# Patient Record
Sex: FEMALE | Race: WHITE | NOT HISPANIC OR LATINO | Employment: OTHER | URBAN - METROPOLITAN AREA
[De-identification: names, ages, dates, MRNs, and addresses within clinical notes are randomized per-mention and may not be internally consistent; named-entity substitution may affect disease eponyms.]

---

## 2017-11-27 ENCOUNTER — HOSPITAL ENCOUNTER (EMERGENCY)
Facility: HOSPITAL | Age: 62
Discharge: HOME | End: 2017-11-27
Attending: EMERGENCY MEDICINE
Payer: MEDICARE

## 2017-11-27 VITALS
HEIGHT: 65 IN | BODY MASS INDEX: 23.66 KG/M2 | HEART RATE: 61 BPM | WEIGHT: 142 LBS | RESPIRATION RATE: 16 BRPM | SYSTOLIC BLOOD PRESSURE: 117 MMHG | OXYGEN SATURATION: 96 % | TEMPERATURE: 98 F | DIASTOLIC BLOOD PRESSURE: 71 MMHG

## 2017-11-27 DIAGNOSIS — G89.4 CHRONIC PAIN SYNDROME: ICD-10-CM

## 2017-11-27 DIAGNOSIS — S39.012A STRAIN OF LUMBAR REGION, INITIAL ENCOUNTER: ICD-10-CM

## 2017-11-27 DIAGNOSIS — S29.019A THORACIC MYOFASCIAL STRAIN, INITIAL ENCOUNTER: Primary | ICD-10-CM

## 2017-11-27 DIAGNOSIS — M25.552 LEFT HIP PAIN: ICD-10-CM
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
## 2017-11-27 DIAGNOSIS — I10 ESSENTIAL HYPERTENSION: ICD-10-CM

## 2017-11-27 DIAGNOSIS — M54.2 NECK PAIN: ICD-10-CM

## 2017-11-27 PROCEDURE — 99284 EMERGENCY DEPT VISIT MOD MDM: CPT

## 2017-11-27 PROCEDURE — 25000003 PHARM REV CODE 250: Performed by: EMERGENCY MEDICINE

## 2017-11-27 RX ORDER — CYCLOBENZAPRINE HCL 10 MG
10 TABLET ORAL 3 TIMES DAILY PRN
Qty: 30 TABLET | Refills: 0 | Status: ON HOLD | OUTPATIENT
Start: 2017-11-27 | End: 2020-02-09 | Stop reason: SDUPTHER

## 2017-11-27 RX ORDER — CYCLOBENZAPRINE HCL 10 MG
10 TABLET ORAL
Status: COMPLETED | OUTPATIENT
Start: 2017-11-27 | End: 2017-11-27

## 2017-11-27 RX ORDER — MELOXICAM 7.5 MG/1
7.5 TABLET ORAL DAILY PRN
Qty: 30 TABLET | Refills: 0 | Status: SHIPPED | OUTPATIENT
Start: 2017-11-27 | End: 2017-12-27

## 2017-11-27 RX ADMIN — CYCLOBENZAPRINE HYDROCHLORIDE 10 MG: 10 TABLET, FILM COATED ORAL at 02:11
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
## 2018-01-22 ENCOUNTER — HOSPITAL ENCOUNTER (OUTPATIENT)
Dept: RADIOLOGY | Facility: HOSPITAL | Age: 63
Discharge: HOME | End: 2018-01-22
Attending: FAMILY MEDICINE
Payer: MEDICARE

## 2018-01-22 VITALS — BODY MASS INDEX: 23.66 KG/M2 | WEIGHT: 142 LBS | HEIGHT: 65 IN

## 2018-01-22 DIAGNOSIS — I10 HTN (HYPERTENSION): ICD-10-CM

## 2018-01-22 DIAGNOSIS — Z12.39 SCREENING BREAST EXAMINATION: Primary | ICD-10-CM

## 2018-01-22 DIAGNOSIS — N18.2 CHRONIC KIDNEY DISEASE, STAGE II (MILD): ICD-10-CM

## 2018-01-22 DIAGNOSIS — Z12.39 SCREENING BREAST EXAMINATION: ICD-10-CM

## 2018-01-22 DIAGNOSIS — N18.2 CHRONIC KIDNEY DISEASE, STAGE II (MILD): Primary | ICD-10-CM

## 2018-01-22 PROCEDURE — 76770 US EXAM ABDO BACK WALL COMP: CPT | Mod: 26,,, | Performed by: RADIOLOGY

## 2018-01-22 PROCEDURE — 76770 US EXAM ABDO BACK WALL COMP: CPT | Mod: TC,PO

## 2018-01-22 PROCEDURE — 77067 SCR MAMMO BI INCL CAD: CPT | Mod: TC,PO

## 2018-01-22 PROCEDURE — 77063 BREAST TOMOSYNTHESIS BI: CPT | Mod: 26,,, | Performed by: RADIOLOGY

## 2018-01-22 PROCEDURE — 77067 SCR MAMMO BI INCL CAD: CPT | Mod: 26,,, | Performed by: RADIOLOGY

## 2018-01-29 ENCOUNTER — TELEPHONE (OUTPATIENT)
Dept: RADIOLOGY | Facility: HOSPITAL | Age: 63
End: 2018-01-29

## 2018-02-05 ENCOUNTER — HOSPITAL ENCOUNTER (OUTPATIENT)
Dept: RADIOLOGY | Facility: HOSPITAL | Age: 63
Discharge: HOME | End: 2018-02-05
Attending: FAMILY MEDICINE
Payer: MEDICARE

## 2018-02-05 DIAGNOSIS — R92.8 ABNORMAL MAMMOGRAM: ICD-10-CM

## 2018-02-05 PROCEDURE — 77065 DX MAMMO INCL CAD UNI: CPT | Mod: 26,,, | Performed by: RADIOLOGY

## 2018-02-05 PROCEDURE — 77061 BREAST TOMOSYNTHESIS UNI: CPT | Mod: TC,PO

## 2018-02-05 PROCEDURE — 76642 ULTRASOUND BREAST LIMITED: CPT | Mod: TC,PO,RT

## 2018-02-05 PROCEDURE — 76642 ULTRASOUND BREAST LIMITED: CPT | Mod: 26,RT,, | Performed by: RADIOLOGY

## 2018-02-05 PROCEDURE — 77061 BREAST TOMOSYNTHESIS UNI: CPT | Mod: 26,,, | Performed by: RADIOLOGY

## 2018-02-05 PROCEDURE — 77065 DX MAMMO INCL CAD UNI: CPT | Mod: TC,PO

## 2018-09-14 ENCOUNTER — HOSPITAL ENCOUNTER (EMERGENCY)
Facility: HOSPITAL | Age: 63
Discharge: ANOTHER HOSPITAL | End: 2018-09-14
Attending: EMERGENCY MEDICINE
Payer: COMMERCIAL

## 2018-09-14 VITALS
WEIGHT: 140 LBS | RESPIRATION RATE: 20 BRPM | HEART RATE: 60 BPM | SYSTOLIC BLOOD PRESSURE: 149 MMHG | TEMPERATURE: 100 F | BODY MASS INDEX: 23.3 KG/M2 | OXYGEN SATURATION: 93 % | DIASTOLIC BLOOD PRESSURE: 71 MMHG

## 2018-09-14 DIAGNOSIS — R06.02 SOB (SHORTNESS OF BREATH): ICD-10-CM

## 2018-09-14 DIAGNOSIS — I50.9 CONGESTIVE HEART FAILURE, UNSPECIFIED HF CHRONICITY, UNSPECIFIED HEART FAILURE TYPE: Primary | ICD-10-CM

## 2018-09-14 LAB
ALBUMIN SERPL BCP-MCNC: 3.5 G/DL
ALLENS TEST: ABNORMAL
ALP SERPL-CCNC: 84 U/L
ALT SERPL W/O P-5'-P-CCNC: 41 U/L
ANION GAP SERPL CALC-SCNC: 10 MMOL/L
AST SERPL-CCNC: 36 U/L
BASOPHILS # BLD AUTO: 0.11 K/UL
BASOPHILS NFR BLD: 0.9 %
BILIRUB SERPL-MCNC: 0.4 MG/DL
BNP SERPL-MCNC: 1828 PG/ML
BUN SERPL-MCNC: 42 MG/DL
CALCIUM SERPL-MCNC: 9.5 MG/DL
CHLORIDE SERPL-SCNC: 106 MMOL/L
CO2 SERPL-SCNC: 26 MMOL/L
CREAT SERPL-MCNC: 2 MG/DL
DELSYS: ABNORMAL
DIFFERENTIAL METHOD: ABNORMAL
EOSINOPHIL # BLD AUTO: 0.1 K/UL
EOSINOPHIL NFR BLD: 0.4 %
ERYTHROCYTE [DISTWIDTH] IN BLOOD BY AUTOMATED COUNT: 13.3 %
EST. GFR  (AFRICAN AMERICAN): 29.6 ML/MIN/1.73 M^2
EST. GFR  (NON AFRICAN AMERICAN): 25.6 ML/MIN/1.73 M^2
FLOW: 4
GLUCOSE SERPL-MCNC: 165 MG/DL
HCO3 UR-SCNC: 28 MMOL/L (ref 24–28)
HCT VFR BLD AUTO: 36.1 %
HGB BLD-MCNC: 11.1 G/DL
LYMPHOCYTES # BLD AUTO: 1.1 K/UL
LYMPHOCYTES NFR BLD: 9.1 %
MCH RBC QN AUTO: 28.8 PG
MCHC RBC AUTO-ENTMCNC: 30.7 G/DL
MCV RBC AUTO: 94 FL
MODE: ABNORMAL
MONOCYTES # BLD AUTO: 0.6 K/UL
MONOCYTES NFR BLD: 5.2 %
NEUTROPHILS # BLD AUTO: 10.4 K/UL
NEUTROPHILS NFR BLD: 84.2 %
PCO2 BLDA: 50.3 MMHG (ref 35–45)
PH SMN: 7.35 [PH] (ref 7.35–7.45)
PLATELET # BLD AUTO: 183 K/UL
PMV BLD AUTO: 11.7 FL
PO2 BLDA: 42 MMHG (ref 80–100)
POC BE: 2 MMOL/L
POC SATURATED O2: 75 % (ref 95–100)
POTASSIUM SERPL-SCNC: 4.5 MMOL/L
PROT SERPL-MCNC: 7.5 G/DL
RBC # BLD AUTO: 3.86 M/UL
SAMPLE: ABNORMAL
SITE: ABNORMAL
SODIUM SERPL-SCNC: 142 MMOL/L
TROPONIN I SERPL DL<=0.01 NG/ML-MCNC: 0.01 NG/ML
WBC # BLD AUTO: 12.35 K/UL

## 2018-09-14 PROCEDURE — 25000003 PHARM REV CODE 250: Performed by: EMERGENCY MEDICINE

## 2018-09-14 PROCEDURE — 36600 WITHDRAWAL OF ARTERIAL BLOOD: CPT

## 2018-09-14 PROCEDURE — 25000242 PHARM REV CODE 250 ALT 637 W/ HCPCS: Performed by: EMERGENCY MEDICINE

## 2018-09-14 PROCEDURE — 85025 COMPLETE CBC W/AUTO DIFF WBC: CPT

## 2018-09-14 PROCEDURE — 84484 ASSAY OF TROPONIN QUANT: CPT

## 2018-09-14 PROCEDURE — 27000221 HC OXYGEN, UP TO 24 HOURS

## 2018-09-14 PROCEDURE — 27100171 HC OXYGEN HIGH FLOW UP TO 24 HOURS

## 2018-09-14 PROCEDURE — 63600175 PHARM REV CODE 636 W HCPCS: Performed by: EMERGENCY MEDICINE

## 2018-09-14 PROCEDURE — 82803 BLOOD GASES ANY COMBINATION: CPT

## 2018-09-14 PROCEDURE — 94640 AIRWAY INHALATION TREATMENT: CPT

## 2018-09-14 PROCEDURE — 96374 THER/PROPH/DIAG INJ IV PUSH: CPT

## 2018-09-14 PROCEDURE — 96375 TX/PRO/DX INJ NEW DRUG ADDON: CPT

## 2018-09-14 PROCEDURE — 93010 ELECTROCARDIOGRAM REPORT: CPT | Mod: ,,, | Performed by: INTERNAL MEDICINE

## 2018-09-14 PROCEDURE — 99900035 HC TECH TIME PER 15 MIN (STAT)

## 2018-09-14 PROCEDURE — 83880 ASSAY OF NATRIURETIC PEPTIDE: CPT

## 2018-09-14 PROCEDURE — 80053 COMPREHEN METABOLIC PANEL: CPT

## 2018-09-14 PROCEDURE — 27000190 HC CPAP FULL FACE MASK W/VALVE

## 2018-09-14 PROCEDURE — 99291 CRITICAL CARE FIRST HOUR: CPT | Mod: 25

## 2018-09-14 RX ORDER — HYDRALAZINE HYDROCHLORIDE 50 MG/1
50 TABLET, FILM COATED ORAL 3 TIMES DAILY
Status: ON HOLD | COMMUNITY
Start: 2018-08-14 | End: 2020-02-09

## 2018-09-14 RX ORDER — CLONIDINE HYDROCHLORIDE 0.1 MG/1
0.1 TABLET ORAL ONCE
Status: ON HOLD | COMMUNITY
End: 2020-02-09 | Stop reason: HOSPADM

## 2018-09-14 RX ORDER — ASPIRIN 81 MG/1
81 TABLET ORAL DAILY
Status: ON HOLD | COMMUNITY
End: 2020-02-09 | Stop reason: HOSPADM

## 2018-09-14 RX ORDER — LOSARTAN POTASSIUM 100 MG/1
100 TABLET ORAL DAILY
Status: ON HOLD | COMMUNITY
End: 2020-02-09 | Stop reason: HOSPADM

## 2018-09-14 RX ORDER — FUROSEMIDE 10 MG/ML
40 INJECTION INTRAMUSCULAR; INTRAVENOUS
Status: COMPLETED | OUTPATIENT
Start: 2018-09-14 | End: 2018-09-14

## 2018-09-14 RX ORDER — METHYLPREDNISOLONE SOD SUCC 125 MG
125 VIAL (EA) INJECTION
Status: COMPLETED | OUTPATIENT
Start: 2018-09-14 | End: 2018-09-14

## 2018-09-14 RX ORDER — ONDANSETRON 2 MG/ML
4 INJECTION INTRAMUSCULAR; INTRAVENOUS
Status: COMPLETED | OUTPATIENT
Start: 2018-09-14 | End: 2018-09-14

## 2018-09-14 RX ORDER — METOPROLOL SUCCINATE 100 MG/1
100 TABLET, EXTENDED RELEASE ORAL DAILY
Status: ON HOLD | COMMUNITY
End: 2020-02-09 | Stop reason: HOSPADM

## 2018-09-14 RX ORDER — IPRATROPIUM BROMIDE AND ALBUTEROL SULFATE 2.5; .5 MG/3ML; MG/3ML
3 SOLUTION RESPIRATORY (INHALATION)
Status: COMPLETED | OUTPATIENT
Start: 2018-09-14 | End: 2018-09-14

## 2018-09-14 RX ORDER — GABAPENTIN 300 MG/1
300 CAPSULE ORAL 3 TIMES DAILY
Status: ON HOLD | COMMUNITY
End: 2020-02-09 | Stop reason: HOSPADM

## 2018-09-14 RX ADMIN — METHYLPREDNISOLONE SODIUM SUCCINATE 125 MG: 125 INJECTION, POWDER, FOR SOLUTION INTRAMUSCULAR; INTRAVENOUS at 07:09

## 2018-09-14 RX ADMIN — IPRATROPIUM BROMIDE AND ALBUTEROL SULFATE 3 ML: .5; 3 SOLUTION RESPIRATORY (INHALATION) at 07:09

## 2018-09-14 RX ADMIN — ONDANSETRON 4 MG: 2 INJECTION INTRAMUSCULAR; INTRAVENOUS at 08:09

## 2018-09-14 RX ADMIN — NITROGLYCERIN 1 INCH: 20 OINTMENT TOPICAL at 08:09

## 2018-09-14 RX ADMIN — FUROSEMIDE 40 MG: 10 INJECTION, SOLUTION INTRAMUSCULAR; INTRAVENOUS at 08:09

## 2018-09-14 NOTE — ED NOTES
"Pt c/o SOB. Reports onset of symptoms last night. Symptoms worsen with  exertion. Previous treatments include neb treatment at home. Hx of COPD and abdominal aneurysm, but daughter also reports that she had carotid surgery last month . Pt denies CP. Also report hx of BP in R arm being greater than BP in L arm.     Level of Consciousness: Patient is awake, alert, oriented to person, place, time, and situation, but having some hallucinations.    Appearance: Pt sitting up in stretcher,moderate distress at this time. Appears ill.    Skin: Skin is warm, dry, and intact. Skin turgor is normal/elastic. Mucous membranes moist. Skin color is normal for ethnicity. No skin breakdown noted.  Musculoskeletal: Full active ROM. No deformities noted. Generalized fatigue. Gait unsteady, pt weak and unable to stand on her own.   Respiratory: Respirations labored,tachypneic. Breath sounds diminished with expiratory wheezes throughout.   Cardiac: Regular rate and rhythm with ST depression. +trace edema to RLL. Radial and pedal pulses present and normal. Capillary refill is within normal limits. Denies chest pain.    GI: Abdomen soft, non-tender to all quadrants with palpation. Bowel sounds present and active in all quads. Abdomen symmetric with no distention noted. Denies any V/D. +nausea  Neurological: Symmetrical expressions noted to face.  No obvious neurological deficits noted.   Psychosocial: Speech spontaneous, mumbled. Family at bedside. Pt is appears anxious with some visual hallucinations.  Pt asked daughter, "Who put a snake in my pants."     Pt informed of plan of care, verbalizes understanding, and denies any other questions, complaints, or concerns at this time. Bed in locked in lowest position, siderails up x2, call light within reach. Pt placed on cardiac monitor, blood pressure cuff and continuous pulse ox. Will continue to monitor.  "

## 2018-09-14 NOTE — ED NOTES
Ivana from [facility] connected with JU[facility], Spoke with Stella, house supervisor. Pt accepted by Dr. Davis Nicole.

## 2018-09-14 NOTE — PROGRESS NOTES
Patient came into ER very short of breath , after 3 duo nebs patient work of breathing has improved . ABG done and shown to

## 2018-09-14 NOTE — ED PROVIDER NOTES
Encounter Date: 9/14/2018       History     Chief Complaint   Patient presents with    Shortness of Breath     onset last night. hx of COPD. O2: 67% in triage     Patient is a 63-year-old female with a past medical history as described below including COPD and recent carotid thromboendarterectomy in the past month by Dr. Whitney at [facility], who presents today with complaints of shortness of breath with confusion and hallucinations.  Patient's oxygen saturation was 67% in triage.  Hypoxia was confirmed in the ED exam room with good waveform.  Patient has been taking her albuterol at home without relief.  Patient is not on any home oxygen.  Patient has no known history of CHF.  No pedal edema.  Denies chest pain, cough, fever/chills, headache, head trauma, nausea/vomiting, diaphoresis, new leg pain/swelling.  Family bedside does state that patient had a prolonged hospital stay after her recent carotid surgery, and states that 1 of the reasons was hypoxia.  Patient is on daily aspirin and Plavix          Review of patient's allergies indicates:  No Known Allergies  Past Medical History:   Diagnosis Date    Cervical cancer     Chronic kidney disease (CKD), stage II (mild)     COPD (chronic obstructive pulmonary disease)     GERD (gastroesophageal reflux disease)     Hypertension     Liver disease     Lumbar herniated disc     Renal disorder     Thyroid disease      Past Surgical History:   Procedure Laterality Date    CAROTID ENDARTERECTOMY      CHOLECYSTECTOMY      HYSTERECTOMY      PARATHYROIDECTOMY      TONSILLECTOMY       Family History   Problem Relation Age of Onset    COPD Neg Hx      Social History     Tobacco Use    Smoking status: Current Every Day Smoker     Packs/day: 1.00     Years: 32.00     Pack years: 32.00     Types: Cigarettes    Smokeless tobacco: Never Used   Substance Use Topics    Alcohol use: No    Drug use: No     Review of Systems   Constitutional: Negative for  chills, diaphoresis and fever.   HENT: Negative for congestion, rhinorrhea and sore throat.    Eyes: Negative for pain, redness and visual disturbance.   Respiratory: Positive for shortness of breath. Negative for cough.    Cardiovascular: Negative for chest pain, palpitations and leg swelling.   Gastrointestinal: Negative for abdominal distention, abdominal pain, blood in stool, constipation, diarrhea, nausea and vomiting.   Genitourinary: Negative for dysuria and hematuria.   Musculoskeletal: Negative for arthralgias and joint swelling.   Skin: Negative for rash and wound.   Neurological: Negative for seizures, syncope and headaches.   Psychiatric/Behavioral: Positive for confusion and hallucinations.   All other systems reviewed and are negative.      Physical Exam     Initial Vitals [09/14/18 0736]   BP Pulse Resp Temp SpO2   119/62 71 (!) 28 99.4 °F (37.4 °C) (!) 67 %      MAP       --         Vitals:    09/14/18 0755 09/14/18 0758 09/14/18 0813 09/14/18 0901   BP:    (!) 164/72   BP Location:    Right arm   Patient Position:    Sitting   Pulse: 66 67  61   Resp: (!) 24 (!) 24  19   Temp:       TempSrc:       SpO2: (!) 91% (!) 91% (!) 89% 97%   Weight:       O2 97%  after being placed on BiPAP    Physical Exam    Nursing note and vitals reviewed.  Constitutional: She appears well-developed and well-nourished. No distress.   HENT:   Head: Normocephalic and atraumatic.   Mouth/Throat: Oropharynx is clear and moist.   Eyes: Conjunctivae and EOM are normal. Pupils are equal, round, and reactive to light.   Neck: Neck supple. No tracheal deviation present.   Cardiovascular: Normal rate, regular rhythm, normal heart sounds and intact distal pulses.   Pulmonary/Chest: No respiratory distress. She has wheezes (bilateral expiratory wheezes).   Bilateral B lines on bedside ultrasound   Abdominal: Soft. She exhibits no distension. There is no tenderness. There is no rebound and no guarding.   Musculoskeletal: Normal range  of motion. She exhibits no edema or tenderness.   Neurological: She is alert and oriented to person, place, and time.   No focal deficits; awake and alert to person, time, and place, however patient is making some odd comments that suggest hallucinations; GCS 13 ([id number])   Skin: Skin is warm. No rash noted. No erythema.   Psychiatric: She has a normal mood and affect. Her behavior is normal.         ED Course   Critical Care  Date/Time: 9/14/2018 8:53 AM  Performed by: Giuseppe Beasley MD  Authorized by: Giuseppe Beasley MD   Direct patient critical care time: 15 minutes  Additional history critical care time: 10 minutes  Ordering / reviewing critical care time: 10 minutes  Documentation critical care time: 5 minutes  Consulting other physicians critical care time: 5 minutes  Total critical care time (exclusive of procedural time) : 45 minutes  Critical care time was exclusive of separately billable procedures and treating other patients and teaching time.  Critical care was necessary to treat or prevent imminent or life-threatening deterioration of the following conditions: cardiac failure, respiratory failure and CNS failure or compromise.  Critical care was time spent personally by me on the following activities: blood draw for specimens, discussions with consultants, evaluation of patient's response to treatment, obtaining history from patient or surrogate, ordering and review of laboratory studies, pulse oximetry, review of old charts, development of treatment plan with patient or surrogate, examination of patient, ordering and performing treatments and interventions, ordering and review of radiographic studies and re-evaluation of patient's condition.        Labs Reviewed   B-TYPE NATRIURETIC PEPTIDE - Abnormal; Notable for the following components:       Result Value    BNP 1,828 (*)     All other components within normal limits   COMPREHENSIVE METABOLIC PANEL - Abnormal; Notable for the following components:     Glucose 165 (*)     BUN, Bld 42 (*)     Creatinine 2.0 (*)     eGFR if  29.6 (*)     eGFR if non  25.6 (*)     All other components within normal limits   CBC W/ AUTO DIFFERENTIAL - Abnormal; Notable for the following components:    RBC 3.86 (*)     Hemoglobin 11.1 (*)     Hematocrit 36.1 (*)     MCHC 30.7 (*)     Gran # (ANC) 10.4 (*)     Gran% 84.2 (*)     Lymph% 9.1 (*)     All other components within normal limits   ISTAT PROCEDURE - Abnormal; Notable for the following components:    POC PCO2 50.3 (*)     POC PO2 42 (*)     POC SATURATED O2 75 (*)     All other components within normal limits   TROPONIN I   URINALYSIS, REFLEX TO URINE CULTURE         Results for orders placed or performed during the hospital encounter of 09/14/18   Troponin I   Result Value Ref Range    Troponin I 0.015 0.000 - 0.026 ng/mL   Brain natriuretic peptide   Result Value Ref Range    BNP 1,828 (H) 0 - 99 pg/mL   Comprehensive metabolic panel   Result Value Ref Range    Sodium 142 136 - 145 mmol/L    Potassium 4.5 3.5 - 5.1 mmol/L    Chloride 106 95 - 110 mmol/L    CO2 26 23 - 29 mmol/L    Glucose 165 (H) 70 - 110 mg/dL    BUN, Bld 42 (H) 8 - 23 mg/dL    Creatinine 2.0 (H) 0.5 - 1.4 mg/dL    Calcium 9.5 8.7 - 10.5 mg/dL    Total Protein 7.5 6.0 - 8.4 g/dL    Albumin 3.5 3.5 - 5.2 g/dL    Total Bilirubin 0.4 0.1 - 1.0 mg/dL    Alkaline Phosphatase 84 55 - 135 U/L    AST 36 10 - 40 U/L    ALT 41 10 - 44 U/L    Anion Gap 10 8 - 16 mmol/L    eGFR if African American 29.6 (A) >60 mL/min/1.73 m^2    eGFR if non African American 25.6 (A) >60 mL/min/1.73 m^2   CBC auto differential   Result Value Ref Range    WBC 12.35 3.90 - 12.70 K/uL    RBC 3.86 (L) 4.00 - 5.40 M/uL    Hemoglobin 11.1 (L) 12.0 - 16.0 g/dL    Hematocrit 36.1 (L) 37.0 - 48.5 %    MCV 94 82 - 98 fL    MCH 28.8 27.0 - 31.0 pg    MCHC 30.7 (L) 32.0 - 36.0 g/dL    RDW 13.3 11.5 - 14.5 %    Platelets 183 150 - 350 K/uL    MPV 11.7 9.2 - 12.9 fL     Gran # (ANC) 10.4 (H) 1.8 - 7.7 K/uL    Lymph # 1.1 1.0 - 4.8 K/uL    Mono # 0.6 0.3 - 1.0 K/uL    Eos # 0.1 0.0 - 0.5 K/uL    Baso # 0.11 0.00 - 0.20 K/uL    Gran% 84.2 (H) 38.0 - 73.0 %    Lymph% 9.1 (L) 18.0 - 48.0 %    Mono% 5.2 4.0 - 15.0 %    Eosinophil% 0.4 0.0 - 8.0 %    Basophil% 0.9 0.0 - 1.9 %    Differential Method Automated    ISTAT PROCEDURE   Result Value Ref Range    POC PH 7.354 7.35 - 7.45    POC PCO2 50.3 (H) 35 - 45 mmHg    POC PO2 42 (LL) 80 - 100 mmHg    POC HCO3 28.0 24 - 28 mmol/L    POC BE 2 -2 to 2 mmol/L    POC SATURATED O2 75 (L) 95 - 100 %    Sample ARTERIAL     Site RR     Allens Test Pass     DelSys Nasal Can     Mode SPONT     Flow 4        Imaging Results          X-Ray Chest AP Portable (Final result)  Result time 09/14/18 08:29:21    Final result by CHAY Lake Sr., MD (09/14/18 08:29:21)                 Impression:      1. There has been interval worsening of the appearance of the lungs. There is a marked amount of interstitial and alveolar opacities seen in both lungs with Kerley B-lines visualized bilaterally.  This is consistent with the patient's history and characteristic of pulmonary edema.  2. There is mild cardiomegaly.  .      Electronically signed by: Stanford Lake MD  Date:    09/14/2018  Time:    08:29             Narrative:    EXAMINATION:  XR CHEST AP PORTABLE    CLINICAL HISTORY:  SOB; hypoxia; h/o COPD;    COMPARISON:  12/27/2015    FINDINGS:  There is mild cardiomegaly.  There has been interval worsening of the appearance of the lungs.  There is a marked amount of interstitial and alveolar opacities seen in both lungs with Kerley B-lines visualized bilaterally.  There is no pneumothorax.  The costophrenic angles are sharp.                              Bedside Ultrasound shows bilateral B lines consistent with pulmonary edema    EKG:  Normal sinus rhythm with a rate of 69, normal axis, normal intervals, no significant T-wave inversions, ST depression in  inferior leads; artifact preventing accurate interpretation the leads V4 to V6, multiple attempts at obtaining EKG without artifact were unsuccessful; no prior EKG immediately available for comparison; there is however a EKG report from related [facility] the does not mention anything about ST depression     Medical Decision Making:   Patient with history of COPD presented hypoxic with confusion secondary to hypoxia; patient had bilateral wheezing; initial suspicion was for COPD exacerbation and patient was initially treated as such   However after bedside ultrasound, chest x-ray, and BNP, the diagnosis was more consistent with new onset CHF; patient was taken off of nasal cannula oxygen and placed on BiPAP; Lasix was given; initially patient had a blood pressure of 119/62 on the LUE, so no nitroglycerin was given to lower the blood pressure for CHF, however patient was noted to be hypertensive later with systolic 180's when taken on the RUE; family then explained us patient has chronic blood pressure discrepancies in her bilateral upper extremities with a chronically being low on the left; at this time, nitropaste was ordered; patient's mental status improved in the ED with better oxygenation  Patient will require admission; patient and family verbalized understanding of plan of care including need for admission; family requesting admission to our [facility], citing that this is where her doctors are located; will arrange for transfer to [facility]; patient will require transfer because patient requires inpatient services that we do not have available here; patient will be transferred via ambulance with NIPPV and cardiac/respiratory monitoring capabilities;        Dr. Davis Nicole at [facility] has accepted the transfer                 Clinical Impression:   Diagnosis: CHF exacerbation  Disposition: Transfer to [facility] for admission                               Giuseppe Beasley MD  09/14/18  0917       Giuseppe Beasley MD  09/14/18 1038

## 2018-09-25 ENCOUNTER — HOSPITAL ENCOUNTER (EMERGENCY)
Facility: HOSPITAL | Age: 63
Discharge: HOME | End: 2018-09-25
Attending: EMERGENCY MEDICINE | Admitting: EMERGENCY MEDICINE
Payer: COMMERCIAL

## 2018-09-25 VITALS
DIASTOLIC BLOOD PRESSURE: 60 MMHG | HEIGHT: 66 IN | RESPIRATION RATE: 15 BRPM | HEART RATE: 41 BPM | WEIGHT: 140 LBS | OXYGEN SATURATION: 97 % | TEMPERATURE: 99 F | BODY MASS INDEX: 22.5 KG/M2 | SYSTOLIC BLOOD PRESSURE: 127 MMHG

## 2018-09-25 DIAGNOSIS — R09.02 HYPOXIA: ICD-10-CM

## 2018-09-25 DIAGNOSIS — R00.1 BRADYCARDIA: ICD-10-CM

## 2018-09-25 DIAGNOSIS — I50.23 ACUTE ON CHRONIC SYSTOLIC CONGESTIVE HEART FAILURE: Primary | ICD-10-CM

## 2018-09-25 DIAGNOSIS — R41.82 ALTERED MENTAL STATUS: ICD-10-CM

## 2018-09-25 DIAGNOSIS — N17.9 AKI (ACUTE KIDNEY INJURY): ICD-10-CM

## 2018-09-25 LAB
ALBUMIN SERPL BCP-MCNC: 2.6 G/DL
ALLENS TEST: ABNORMAL
ALP SERPL-CCNC: 81 U/L
ALT SERPL W/O P-5'-P-CCNC: 13 U/L
AMMONIA PLAS-SCNC: 18 UMOL/L
AMORPH CRY UR QL COMP ASSIST: NORMAL
AMPHET+METHAMPHET UR QL: NEGATIVE
ANION GAP SERPL CALC-SCNC: 9 MMOL/L
AST SERPL-CCNC: 18 U/L
BACTERIA #/AREA URNS AUTO: NORMAL /HPF
BARBITURATES UR QL SCN>200 NG/ML: NEGATIVE
BASOPHILS # BLD AUTO: 0.1 K/UL
BASOPHILS NFR BLD: 0.7 %
BENZODIAZ UR QL SCN>200 NG/ML: NORMAL
BILIRUB SERPL-MCNC: 0.4 MG/DL
BILIRUB UR QL STRIP: NEGATIVE
BNP SERPL-MCNC: 1222 PG/ML
BUN SERPL-MCNC: 57 MG/DL
BZE UR QL SCN: NEGATIVE
CALCIUM SERPL-MCNC: 9.3 MG/DL
CANNABINOIDS UR QL SCN: NEGATIVE
CHLORIDE SERPL-SCNC: 104 MMOL/L
CK SERPL-CCNC: 180 U/L
CLARITY UR REFRACT.AUTO: ABNORMAL
CO2 SERPL-SCNC: 25 MMOL/L
COLOR UR AUTO: YELLOW
CREAT SERPL-MCNC: 3.2 MG/DL
CREAT UR-MCNC: 117.4 MG/DL
DELSYS: ABNORMAL
DIFFERENTIAL METHOD: ABNORMAL
EOSINOPHIL # BLD AUTO: 0.6 K/UL
EOSINOPHIL NFR BLD: 4.4 %
ERYTHROCYTE [DISTWIDTH] IN BLOOD BY AUTOMATED COUNT: 12.6 %
EST. GFR  (AFRICAN AMERICAN): 16.7 ML/MIN/1.73 M^2
EST. GFR  (NON AFRICAN AMERICAN): 14.5 ML/MIN/1.73 M^2
ETHANOL SERPL-MCNC: <10 MG/DL
FIO2: 21
GLUCOSE SERPL-MCNC: 106 MG/DL
GLUCOSE UR QL STRIP: NEGATIVE
HCO3 UR-SCNC: 22.7 MMOL/L (ref 24–28)
HCT VFR BLD AUTO: 35.1 %
HGB BLD-MCNC: 11.1 G/DL
HGB UR QL STRIP: NEGATIVE
HYALINE CASTS UR QL AUTO: 0 /LPF
KETONES UR QL STRIP: NEGATIVE
LEUKOCYTE ESTERASE UR QL STRIP: NEGATIVE
LYMPHOCYTES # BLD AUTO: 1.6 K/UL
LYMPHOCYTES NFR BLD: 12.1 %
MCH RBC QN AUTO: 28.5 PG
MCHC RBC AUTO-ENTMCNC: 31.6 G/DL
MCV RBC AUTO: 90 FL
METHADONE UR QL SCN>300 NG/ML: NEGATIVE
MICROSCOPIC COMMENT: NORMAL
MODE: ABNORMAL
MONOCYTES # BLD AUTO: 1 K/UL
MONOCYTES NFR BLD: 7.2 %
NEUTROPHILS # BLD AUTO: 10.2 K/UL
NEUTROPHILS NFR BLD: 75.3 %
NITRITE UR QL STRIP: NEGATIVE
OPIATES UR QL SCN: NORMAL
PCO2 BLDA: 42.8 MMHG (ref 35–45)
PCP UR QL SCN>25 NG/ML: NEGATIVE
PH SMN: 7.33 [PH] (ref 7.35–7.45)
PH UR STRIP: 6 [PH] (ref 5–8)
PLATELET # BLD AUTO: 232 K/UL
PMV BLD AUTO: 10.7 FL
PO2 BLDA: 62 MMHG (ref 80–100)
POC BE: -3 MMOL/L
POC SATURATED O2: 90 % (ref 95–100)
POTASSIUM SERPL-SCNC: 4.5 MMOL/L
PROT SERPL-MCNC: 6.5 G/DL
PROT UR QL STRIP: ABNORMAL
RBC # BLD AUTO: 3.89 M/UL
RBC #/AREA URNS AUTO: 2 /HPF (ref 0–4)
SAMPLE: ABNORMAL
SITE: ABNORMAL
SODIUM SERPL-SCNC: 138 MMOL/L
SP GR UR STRIP: 1.02 (ref 1–1.03)
SQUAMOUS #/AREA URNS AUTO: 3 /HPF
TOXICOLOGY INFORMATION: NORMAL
TROPONIN I SERPL DL<=0.01 NG/ML-MCNC: 0.03 NG/ML
TSH SERPL DL<=0.005 MIU/L-ACNC: 1.86 UIU/ML
URN SPEC COLLECT METH UR: ABNORMAL
UROBILINOGEN UR STRIP-ACNC: NEGATIVE EU/DL
WBC # BLD AUTO: 13.5 K/UL
WBC #/AREA URNS AUTO: 0 /HPF (ref 0–5)

## 2018-09-25 PROCEDURE — 84443 ASSAY THYROID STIM HORMONE: CPT

## 2018-09-25 PROCEDURE — 63600175 PHARM REV CODE 636 W HCPCS: Performed by: EMERGENCY MEDICINE

## 2018-09-25 PROCEDURE — 93005 ELECTROCARDIOGRAM TRACING: CPT

## 2018-09-25 PROCEDURE — 82140 ASSAY OF AMMONIA: CPT

## 2018-09-25 PROCEDURE — 94640 AIRWAY INHALATION TREATMENT: CPT

## 2018-09-25 PROCEDURE — 25000242 PHARM REV CODE 250 ALT 637 W/ HCPCS: Performed by: EMERGENCY MEDICINE

## 2018-09-25 PROCEDURE — 81000 URINALYSIS NONAUTO W/SCOPE: CPT | Mod: 59

## 2018-09-25 PROCEDURE — 93010 ELECTROCARDIOGRAM REPORT: CPT | Mod: ,,, | Performed by: INTERNAL MEDICINE

## 2018-09-25 PROCEDURE — 99285 EMERGENCY DEPT VISIT HI MDM: CPT | Mod: 25

## 2018-09-25 PROCEDURE — 83880 ASSAY OF NATRIURETIC PEPTIDE: CPT

## 2018-09-25 PROCEDURE — 80320 DRUG SCREEN QUANTALCOHOLS: CPT

## 2018-09-25 PROCEDURE — 80053 COMPREHEN METABOLIC PANEL: CPT

## 2018-09-25 PROCEDURE — 36600 WITHDRAWAL OF ARTERIAL BLOOD: CPT

## 2018-09-25 PROCEDURE — 99900035 HC TECH TIME PER 15 MIN (STAT)

## 2018-09-25 PROCEDURE — 96375 TX/PRO/DX INJ NEW DRUG ADDON: CPT

## 2018-09-25 PROCEDURE — 84484 ASSAY OF TROPONIN QUANT: CPT

## 2018-09-25 PROCEDURE — 85025 COMPLETE CBC W/AUTO DIFF WBC: CPT

## 2018-09-25 PROCEDURE — 96374 THER/PROPH/DIAG INJ IV PUSH: CPT

## 2018-09-25 PROCEDURE — 80307 DRUG TEST PRSMV CHEM ANLYZR: CPT

## 2018-09-25 PROCEDURE — 82550 ASSAY OF CK (CPK): CPT

## 2018-09-25 RX ORDER — IPRATROPIUM BROMIDE AND ALBUTEROL SULFATE 2.5; .5 MG/3ML; MG/3ML
3 SOLUTION RESPIRATORY (INHALATION)
Status: COMPLETED | OUTPATIENT
Start: 2018-09-25 | End: 2018-09-25

## 2018-09-25 RX ORDER — FUROSEMIDE 10 MG/ML
40 INJECTION INTRAMUSCULAR; INTRAVENOUS
Status: COMPLETED | OUTPATIENT
Start: 2018-09-25 | End: 2018-09-25

## 2018-09-25 RX ORDER — TRAZODONE HYDROCHLORIDE 50 MG/1
50 TABLET ORAL
COMMUNITY
Start: 2018-09-19 | End: 2018-10-19

## 2018-09-25 RX ORDER — METHYLPREDNISOLONE SOD SUCC 125 MG
125 VIAL (EA) INJECTION
Status: COMPLETED | OUTPATIENT
Start: 2018-09-25 | End: 2018-09-25

## 2018-09-25 RX ORDER — PROMETHAZINE HYDROCHLORIDE 25 MG/1
25 TABLET ORAL
Status: ON HOLD | COMMUNITY
End: 2020-02-09 | Stop reason: HOSPADM

## 2018-09-25 RX ADMIN — IPRATROPIUM BROMIDE AND ALBUTEROL SULFATE 3 ML: .5; 3 SOLUTION RESPIRATORY (INHALATION) at 10:09

## 2018-09-25 RX ADMIN — METHYLPREDNISOLONE SODIUM SUCCINATE 125 MG: 125 INJECTION, POWDER, FOR SOLUTION INTRAMUSCULAR; INTRAVENOUS at 10:09

## 2018-09-25 RX ADMIN — FUROSEMIDE 40 MG: 10 INJECTION, SOLUTION INTRAMUSCULAR; INTRAVENOUS at 11:09

## 2018-09-25 NOTE — ED NOTES
Attempts to get authorization from [facility] spoke with Mor at transport for 20 min and states does not do that and connected to another number that was dead end. Called [facility] spoke with sushila and has transport enroute and have 24 hours for authorization number.

## 2018-09-25 NOTE — ED PROVIDER NOTES
Encounter Date: 9/25/2018       History     Chief Complaint   Patient presents with    Altered Mental Status     pt with altered mental status since sun. worse today. seen by dr. duarte vascular surgeon yest.     The history is provided by a relative.   Altered Mental Status   This is a recurrent problem. The current episode started 6 to 12 hours ago. The problem occurs constantly. The problem has not changed since onset.Associated symptoms include shortness of breath. Pertinent negatives include no chest pain, no abdominal pain and no headaches.     Review of patient's allergies indicates:  No Known Allergies  Past Medical History:   Diagnosis Date    Cervical cancer     Chronic kidney disease (CKD), stage II (mild)     COPD (chronic obstructive pulmonary disease)     GERD (gastroesophageal reflux disease)     Hypertension     Liver disease     Lumbar herniated disc     Renal disorder     Thyroid disease      Past Surgical History:   Procedure Laterality Date    CAROTID ENDARTERECTOMY      CHOLECYSTECTOMY      HYSTERECTOMY      PARATHYROIDECTOMY      TONSILLECTOMY       Family History   Problem Relation Age of Onset    COPD Neg Hx      Social History     Tobacco Use    Smoking status: Current Every Day Smoker     Packs/day: 1.00     Years: 32.00     Pack years: 32.00     Types: Cigarettes    Smokeless tobacco: Never Used   Substance Use Topics    Alcohol use: No    Drug use: No     Review of Systems   Constitutional: Negative for fever.   HENT: Negative for sore throat.    Respiratory: Positive for shortness of breath.    Cardiovascular: Negative for chest pain.   Gastrointestinal: Negative for abdominal pain and nausea.   Genitourinary: Negative for dysuria.   Musculoskeletal: Negative for back pain.   Skin: Negative for rash.   Neurological: Negative for weakness and headaches.   Hematological: Does not bruise/bleed easily.       Physical Exam     Initial Vitals [09/25/18 1002]   BP Pulse  Resp Temp SpO2   (!) 133/58 (!) 42 (!) 22 98.6 °F (37 °C) (!) 92 %      MAP       --         Physical Exam    Nursing note and vitals reviewed.  Constitutional: She appears well-developed and well-nourished. No distress.   HENT:   Head: Normocephalic and atraumatic.   Mouth/Throat: Oropharynx is clear and moist.   Eyes: Conjunctivae and EOM are normal. Pupils are equal, round, and reactive to light.   Neck: Normal range of motion. Neck supple.   Cardiovascular: Regular rhythm and normal heart sounds. Bradycardia present.  Exam reveals no gallop and no friction rub.    No murmur heard.  Pulmonary/Chest: No respiratory distress. She has wheezes. She has rhonchi. She has rales.   Abdominal: Soft. Bowel sounds are normal. She exhibits no distension and no mass. There is no tenderness. There is no rebound and no guarding.   Musculoskeletal: Normal range of motion. She exhibits no edema or tenderness.   Neurological: She is alert. She has normal strength.   Skin: Skin is warm and dry. No rash noted.   Psychiatric: She has a normal mood and affect. Thought content normal.         ED Course   Critical Care  Date/Time: 9/25/2018 11:40 AM  Performed by: Jai Mathur MD  Authorized by: Jai Mathur MD   Direct patient critical care time: 25 minutes  Additional history critical care time: 10 minutes  Ordering / reviewing critical care time: 12 minutes  Documentation critical care time: 15 minutes  Consulting other physicians critical care time: 5 minutes  Total critical care time (exclusive of procedural time) : 67 minutes  Critical care was necessary to treat or prevent imminent or life-threatening deterioration of the following conditions: respiratory failure.        Labs Reviewed   CBC W/ AUTO DIFFERENTIAL - Abnormal; Notable for the following components:       Result Value    WBC 13.50 (*)     RBC 3.89 (*)     Hemoglobin 11.1 (*)     Hematocrit 35.1 (*)     MCHC 31.6 (*)     Gran # (ANC) 10.2 (*)     Eos # 0.6  (*)     Gran% 75.3 (*)     Lymph% 12.1 (*)     All other components within normal limits   COMPREHENSIVE METABOLIC PANEL - Abnormal; Notable for the following components:    BUN, Bld 57 (*)     Creatinine 3.2 (*)     Albumin 2.6 (*)     eGFR if  16.7 (*)     eGFR if non  14.5 (*)     All other components within normal limits   B-TYPE NATRIURETIC PEPTIDE - Abnormal; Notable for the following components:    BNP 1,222 (*)     All other components within normal limits   TROPONIN I - Abnormal; Notable for the following components:    Troponin I 0.031 (*)     All other components within normal limits   ISTAT PROCEDURE - Abnormal; Notable for the following components:    POC PH 7.332 (*)     POC PO2 62 (*)     POC HCO3 22.7 (*)     POC SATURATED O2 90 (*)     All other components within normal limits   AMMONIA   CK   ALCOHOL,MEDICAL (ETHANOL)   TSH   URINALYSIS, REFLEX TO URINE CULTURE   DRUG SCREEN PANEL, URINE EMERGENCY   URINALYSIS MICROSCOPIC     EKG Readings: (Independently Interpreted)   Rhythm: Sinus Bradycardia. Heart Rate: 45. Ectopy: No Ectopy. Conduction: Normal. ST Segments: Normal ST Segments. T Waves: Normal. Clinical Impression: Normal Sinus Rhythm       Imaging Results          X-Ray Chest AP Portable (Final result)  Result time 09/25/18 10:39:39    Final result by Giuseppe Parekh MD (09/25/18 10:39:39)                 Impression:      Patchy infiltrate suspected within the upper lobes and left lower lobe which could reflect multifocal airspace disease or edema.      Electronically signed by: Giuseppe Parekh MD  Date:    09/25/2018  Time:    10:39             Narrative:    EXAMINATION:  XR CHEST AP PORTABLE    CLINICAL HISTORY:  weakness;    TECHNIQUE:  Single frontal view of the chest was performed.    COMPARISON:  09/14/2018    FINDINGS:  Cardiomegaly.  Aorta demonstrates atherosclerotic disease.  No pleural effusion or pneumothorax.  Patchy infiltrate suspected within the  "upper lobes and left lower lobe which could reflect multifocal airspace disease.    In comparison to the prior study, there is no adverse interval changes.                               CT Head Without Contrast (Final result)  Result time 09/25/18 10:36:48    Final result by CHAY Lake Sr., MD (09/25/18 10:36:48)                 Impression:      Normal study.    All CT scans at this facility use dose modulation, iterative reconstruction, and/or weight base dosing when appropriate to reduce radiation dose when appropriate to reduce radiation dose to as low as reasonably achievable.      Electronically signed by: Stanford Lake MD  Date:    09/25/2018  Time:    10:36             Narrative:    EXAMINATION:  CT HEAD WITHOUT CONTRAST    CLINICAL HISTORY:  ams;    TECHNIQUE:  Standard brain CT protocol without IV contrast was performed.    COMPARISON:  None    FINDINGS:  The ventricles have a normal size, position, and appearance. There is no abnormal intracranial mass or intracranial hemorrhage. There is no skull fracture. The paranasal sinuses are normal in appearance.                                  ED Vital Signs:  Vitals:    09/25/18 1002 09/25/18 1017 09/25/18 1021 09/25/18 1028   BP: (!) 133/58 124/60     Pulse: (!) 42 (!) 43 (!) 46 (!) 44   Resp: (!) 22 (!) 36     Temp: 98.6 °F (37 °C)      TempSrc: Oral      SpO2: (!) 92% 97%     Weight: 63.5 kg (140 lb)      Height: 5' 6" (1.676 m)       09/25/18 1044 09/25/18 1102   BP: 124/60 132/63   Pulse: (!) 41 (!) 41   Resp: 15 17   Temp:     TempSrc:     SpO2: 98% 98%   Weight:     Height:           Abnormal Lab Results:  Labs Reviewed   CBC W/ AUTO DIFFERENTIAL - Abnormal; Notable for the following components:       Result Value    WBC 13.50 (*)     RBC 3.89 (*)     Hemoglobin 11.1 (*)     Hematocrit 35.1 (*)     MCHC 31.6 (*)     Gran # (ANC) 10.2 (*)     Eos # 0.6 (*)     Gran% 75.3 (*)     Lymph% 12.1 (*)     All other components within normal limits "   COMPREHENSIVE METABOLIC PANEL - Abnormal; Notable for the following components:    BUN, Bld 57 (*)     Creatinine 3.2 (*)     Albumin 2.6 (*)     eGFR if  16.7 (*)     eGFR if non  14.5 (*)     All other components within normal limits   B-TYPE NATRIURETIC PEPTIDE - Abnormal; Notable for the following components:    BNP 1,222 (*)     All other components within normal limits   TROPONIN I - Abnormal; Notable for the following components:    Troponin I 0.031 (*)     All other components within normal limits   ISTAT PROCEDURE - Abnormal; Notable for the following components:    POC PH 7.332 (*)     POC PO2 62 (*)     POC HCO3 22.7 (*)     POC SATURATED O2 90 (*)     All other components within normal limits   AMMONIA   CK   ALCOHOL,MEDICAL (ETHANOL)   TSH   URINALYSIS, REFLEX TO URINE CULTURE   DRUG SCREEN PANEL, URINE EMERGENCY   URINALYSIS MICROSCOPIC          All Lab Results:  Results for orders placed or performed during the hospital encounter of 09/25/18   CBC auto differential   Result Value Ref Range    WBC 13.50 (H) 3.90 - 12.70 K/uL    RBC 3.89 (L) 4.00 - 5.40 M/uL    Hemoglobin 11.1 (L) 12.0 - 16.0 g/dL    Hematocrit 35.1 (L) 37.0 - 48.5 %    MCV 90 82 - 98 fL    MCH 28.5 27.0 - 31.0 pg    MCHC 31.6 (L) 32.0 - 36.0 g/dL    RDW 12.6 11.5 - 14.5 %    Platelets 232 150 - 350 K/uL    MPV 10.7 9.2 - 12.9 fL    Gran # (ANC) 10.2 (H) 1.8 - 7.7 K/uL    Lymph # 1.6 1.0 - 4.8 K/uL    Mono # 1.0 0.3 - 1.0 K/uL    Eos # 0.6 (H) 0.0 - 0.5 K/uL    Baso # 0.10 0.00 - 0.20 K/uL    Gran% 75.3 (H) 38.0 - 73.0 %    Lymph% 12.1 (L) 18.0 - 48.0 %    Mono% 7.2 4.0 - 15.0 %    Eosinophil% 4.4 0.0 - 8.0 %    Basophil% 0.7 0.0 - 1.9 %    Differential Method Automated    Comprehensive metabolic panel   Result Value Ref Range    Sodium 138 136 - 145 mmol/L    Potassium 4.5 3.5 - 5.1 mmol/L    Chloride 104 95 - 110 mmol/L    CO2 25 23 - 29 mmol/L    Glucose 106 70 - 110 mg/dL    BUN, Bld 57 (H) 8 - 23  mg/dL    Creatinine 3.2 (H) 0.5 - 1.4 mg/dL    Calcium 9.3 8.7 - 10.5 mg/dL    Total Protein 6.5 6.0 - 8.4 g/dL    Albumin 2.6 (L) 3.5 - 5.2 g/dL    Total Bilirubin 0.4 0.1 - 1.0 mg/dL    Alkaline Phosphatase 81 55 - 135 U/L    AST 18 10 - 40 U/L    ALT 13 10 - 44 U/L    Anion Gap 9 8 - 16 mmol/L    eGFR if African American 16.7 (A) >60 mL/min/1.73 m^2    eGFR if non African American 14.5 (A) >60 mL/min/1.73 m^2   Ammonia   Result Value Ref Range    Ammonia 18 10 - 50 umol/L   Brain natriuretic peptide   Result Value Ref Range    BNP 1,222 (H) 0 - 99 pg/mL   CK   Result Value Ref Range     20 - 180 U/L   Troponin I   Result Value Ref Range    Troponin I 0.031 (H) 0.000 - 0.026 ng/mL   Ethanol   Result Value Ref Range    Alcohol, Medical, Serum <10 <10 mg/dL   TSH   Result Value Ref Range    TSH 1.865 0.400 - 4.000 uIU/mL   ISTAT PROCEDURE   Result Value Ref Range    POC PH 7.332 (L) 7.35 - 7.45    POC PCO2 42.8 35 - 45 mmHg    POC PO2 62 (L) 80 - 100 mmHg    POC HCO3 22.7 (L) 24 - 28 mmol/L    POC BE -3 -2 to 2 mmol/L    POC SATURATED O2 90 (L) 95 - 100 %    Sample ARTERIAL     Site LR     Allens Test Pass     DelSys Room Air     Mode SPONT     FiO2 21            Imaging Results:  Imaging Results          X-Ray Chest AP Portable (Final result)  Result time 09/25/18 10:39:39    Final result by Giuseppe Parekh MD (09/25/18 10:39:39)                 Impression:      Patchy infiltrate suspected within the upper lobes and left lower lobe which could reflect multifocal airspace disease or edema.      Electronically signed by: Giuseppe Parekh MD  Date:    09/25/2018  Time:    10:39             Narrative:    EXAMINATION:  XR CHEST AP PORTABLE    CLINICAL HISTORY:  weakness;    TECHNIQUE:  Single frontal view of the chest was performed.    COMPARISON:  09/14/2018    FINDINGS:  Cardiomegaly.  Aorta demonstrates atherosclerotic disease.  No pleural effusion or pneumothorax.  Patchy infiltrate suspected within the upper  lobes and left lower lobe which could reflect multifocal airspace disease.    In comparison to the prior study, there is no adverse interval changes.                               CT Head Without Contrast (Final result)  Result time 09/25/18 10:36:48    Final result by CHAY Lake Sr., MD (09/25/18 10:36:48)                 Impression:      Normal study.    All CT scans at this facility use dose modulation, iterative reconstruction, and/or weight base dosing when appropriate to reduce radiation dose when appropriate to reduce radiation dose to as low as reasonably achievable.      Electronically signed by: Stanford Lake MD  Date:    09/25/2018  Time:    10:36             Narrative:    EXAMINATION:  CT HEAD WITHOUT CONTRAST    CLINICAL HISTORY:  ams;    TECHNIQUE:  Standard brain CT protocol without IV contrast was performed.    COMPARISON:  None    FINDINGS:  The ventricles have a normal size, position, and appearance. There is no abnormal intracranial mass or intracranial hemorrhage. There is no skull fracture. The paranasal sinuses are normal in appearance.                                   The Emergency Provider reviewed the vital signs and test results, which are outlined above.    ED Discussions:  11:12 AM: Re-evaluated pt. I have discussed test results, shared treatment plan, and the need for admission with  family at bedside.  family express understanding at this time and agree with all information. All questions answered.Family has no further questions or concerns at this time. Pt is ready for admit.  Family is requesting [facility], as she was just discharged from there last week.      All historical, clinical, radiographic, and laboratory findings were reviewed with the patient/family in detail along with the indications for transfer to an outside facility (rather than admission to our facility in [place]) secondary to family request and a need for  Admission and IV lasix and oxygen given the  diagnosis of CHF, hypoxia and RICO.  All remaining questions and concerns were addressed at that time and the patient/family communicates understanding and agrees to proceed accordingly.  Similarly all pertinent details of the encounter were discussed with Dr. Rose at [facility] via  [facility] who agrees to accept the patient in transfer based on the needs/patient preferences outlined above.  Patient will be transferred by [facility] ambulance services secondary to a need for ongoing oxygen, and cardiac monitoring en route.  Jai Mathur MD  11:36 AM                             Clinical Impression:       ICD-10-CM ICD-9-CM   1. Acute on chronic systolic congestive heart failure I50.23 428.23     428.0   2. Altered mental status R41.82 780.97   3. RICO (acute kidney injury) N17.9 584.9   4. Hypoxia R09.02 799.02   5. Bradycardia R00.1 427.89           Disposition:   Disposition: Transferred  Condition: Fair                        Jai Mathur MD  09/25/18 4570

## 2020-02-07 ENCOUNTER — HOSPITAL ENCOUNTER (INPATIENT)
Facility: HOSPITAL | Age: 65
LOS: 2 days | Discharge: HOME HEALTH | DRG: 065 | End: 2020-02-09
Attending: EMERGENCY MEDICINE | Admitting: INTERNAL MEDICINE
Payer: MEDICARE

## 2020-02-07 DIAGNOSIS — R29.810 FACIAL DROOP: ICD-10-CM

## 2020-02-07 DIAGNOSIS — R13.10 DYSPHAGIA: ICD-10-CM

## 2020-02-07 DIAGNOSIS — R47.01 APHASIA: ICD-10-CM

## 2020-02-07 DIAGNOSIS — I63.9 STROKE: ICD-10-CM

## 2020-02-07 DIAGNOSIS — R20.0 FACIAL NUMBNESS: ICD-10-CM

## 2020-02-07 DIAGNOSIS — I63.519: Primary | ICD-10-CM

## 2020-02-07 LAB
ALBUMIN SERPL BCP-MCNC: 3.7 G/DL (ref 3.5–5.2)
ALP SERPL-CCNC: 71 U/L (ref 55–135)
ALT SERPL W/O P-5'-P-CCNC: 8 U/L (ref 10–44)
ANION GAP SERPL CALC-SCNC: 11 MMOL/L (ref 8–16)
AST SERPL-CCNC: 17 U/L (ref 10–40)
BASOPHILS # BLD AUTO: 0.14 K/UL (ref 0–0.2)
BASOPHILS NFR BLD: 1.5 % (ref 0–1.9)
BILIRUB SERPL-MCNC: 0.3 MG/DL (ref 0.1–1)
BUN SERPL-MCNC: 42 MG/DL (ref 8–23)
CALCIUM SERPL-MCNC: 9 MG/DL (ref 8.7–10.5)
CHLORIDE SERPL-SCNC: 100 MMOL/L (ref 95–110)
CO2 SERPL-SCNC: 26 MMOL/L (ref 23–29)
CREAT SERPL-MCNC: 2.6 MG/DL (ref 0.5–1.4)
DIFFERENTIAL METHOD: ABNORMAL
EOSINOPHIL # BLD AUTO: 0.6 K/UL (ref 0–0.5)
EOSINOPHIL NFR BLD: 5.9 % (ref 0–8)
ERYTHROCYTE [DISTWIDTH] IN BLOOD BY AUTOMATED COUNT: 13.4 % (ref 11.5–14.5)
EST. GFR  (AFRICAN AMERICAN): 21.4 ML/MIN/1.73 M^2
EST. GFR  (NON AFRICAN AMERICAN): 18.5 ML/MIN/1.73 M^2
GLUCOSE SERPL-MCNC: 164 MG/DL (ref 70–110)
HCT VFR BLD AUTO: 36.7 % (ref 37–48.5)
HGB BLD-MCNC: 12 G/DL (ref 12–16)
IMM GRANULOCYTES # BLD AUTO: 0.02 K/UL (ref 0–0.04)
IMM GRANULOCYTES NFR BLD AUTO: 0.2 % (ref 0–0.5)
INR PPP: 1.1 (ref 0.8–1.2)
LYMPHOCYTES # BLD AUTO: 2.9 K/UL (ref 1–4.8)
LYMPHOCYTES NFR BLD: 31.2 % (ref 18–48)
MCH RBC QN AUTO: 29.1 PG (ref 27–31)
MCHC RBC AUTO-ENTMCNC: 32.7 G/DL (ref 32–36)
MCV RBC AUTO: 89 FL (ref 82–98)
MONOCYTES # BLD AUTO: 0.8 K/UL (ref 0.3–1)
MONOCYTES NFR BLD: 8 % (ref 4–15)
NEUTROPHILS # BLD AUTO: 5 K/UL (ref 1.8–7.7)
NEUTROPHILS NFR BLD: 53.2 % (ref 38–73)
NRBC BLD-RTO: 0 /100 WBC
PLATELET # BLD AUTO: 210 K/UL (ref 150–350)
PMV BLD AUTO: 10.8 FL (ref 9.2–12.9)
POCT GLUCOSE: 134 MG/DL (ref 70–110)
POTASSIUM SERPL-SCNC: 4.1 MMOL/L (ref 3.5–5.1)
PROT SERPL-MCNC: 6.8 G/DL (ref 6–8.4)
PROTHROMBIN TIME: 11.1 SEC (ref 9–12.5)
RBC # BLD AUTO: 4.12 M/UL (ref 4–5.4)
SODIUM SERPL-SCNC: 137 MMOL/L (ref 136–145)
TSH SERPL DL<=0.005 MIU/L-ACNC: 3.46 UIU/ML (ref 0.4–4)
WBC # BLD AUTO: 9.35 K/UL (ref 3.9–12.7)

## 2020-02-07 PROCEDURE — 82962 GLUCOSE BLOOD TEST: CPT | Mod: ER

## 2020-02-07 PROCEDURE — 99285 EMERGENCY DEPT VISIT HI MDM: CPT | Mod: 25,ER

## 2020-02-07 PROCEDURE — 93010 ELECTROCARDIOGRAM REPORT: CPT | Mod: ,,, | Performed by: INTERNAL MEDICINE

## 2020-02-07 PROCEDURE — 84443 ASSAY THYROID STIM HORMONE: CPT | Mod: ER

## 2020-02-07 PROCEDURE — G0425 INPT/ED TELECONSULT30: HCPCS | Mod: GT,,, | Performed by: PSYCHIATRY & NEUROLOGY

## 2020-02-07 PROCEDURE — 80053 COMPREHEN METABOLIC PANEL: CPT | Mod: ER

## 2020-02-07 PROCEDURE — 93005 ELECTROCARDIOGRAM TRACING: CPT | Mod: ER

## 2020-02-07 PROCEDURE — G0425 PR INPT TELEHEALTH CONSULT 30M: ICD-10-PCS | Mod: GT,,, | Performed by: PSYCHIATRY & NEUROLOGY

## 2020-02-07 PROCEDURE — 21400001 HC TELEMETRY ROOM

## 2020-02-07 PROCEDURE — 80061 LIPID PANEL: CPT

## 2020-02-07 PROCEDURE — 93010 EKG 12-LEAD: ICD-10-PCS | Mod: ,,, | Performed by: INTERNAL MEDICINE

## 2020-02-07 PROCEDURE — 85610 PROTHROMBIN TIME: CPT | Mod: ER

## 2020-02-07 PROCEDURE — 85025 COMPLETE CBC W/AUTO DIFF WBC: CPT | Mod: ER

## 2020-02-07 PROCEDURE — 86803 HEPATITIS C AB TEST: CPT

## 2020-02-07 RX ORDER — LABETALOL HYDROCHLORIDE 5 MG/ML
10 INJECTION, SOLUTION INTRAVENOUS
Status: DISPENSED | OUTPATIENT
Start: 2020-02-07 | End: 2020-02-08

## 2020-02-08 PROBLEM — N18.4 STAGE 4 CHRONIC KIDNEY DISEASE: Status: ACTIVE | Noted: 2020-02-08

## 2020-02-08 PROBLEM — Z72.0 TOBACCO ABUSE: Status: ACTIVE | Noted: 2020-02-08

## 2020-02-08 PROBLEM — I63.519: Status: ACTIVE | Noted: 2020-02-08

## 2020-02-08 PROBLEM — I10 HYPERTENSION: Status: ACTIVE | Noted: 2020-02-08

## 2020-02-08 PROBLEM — M51.26 LUMBAR HERNIATED DISC: Status: ACTIVE | Noted: 2020-02-08

## 2020-02-08 PROBLEM — N18.2 CHRONIC KIDNEY DISEASE (CKD), STAGE II (MILD): Status: ACTIVE | Noted: 2020-02-08

## 2020-02-08 PROBLEM — I63.9 STROKE: Status: ACTIVE | Noted: 2020-02-08

## 2020-02-08 PROBLEM — N18.30 STAGE 3 CHRONIC KIDNEY DISEASE: Status: ACTIVE | Noted: 2020-02-08

## 2020-02-08 LAB
ANION GAP SERPL CALC-SCNC: 9 MMOL/L (ref 8–16)
BASOPHILS # BLD AUTO: 0.1 K/UL (ref 0–0.2)
BASOPHILS NFR BLD: 1.3 % (ref 0–1.9)
BUN SERPL-MCNC: 41 MG/DL (ref 8–23)
CALCIUM SERPL-MCNC: 9 MG/DL (ref 8.7–10.5)
CHLORIDE SERPL-SCNC: 102 MMOL/L (ref 95–110)
CHOLEST SERPL-MCNC: 170 MG/DL (ref 120–199)
CHOLEST/HDLC SERPL: 5.7 {RATIO} (ref 2–5)
CK MB SERPL-MCNC: 1.2 NG/ML (ref 0.1–6.5)
CK MB SERPL-RTO: 2.3 % (ref 0–5)
CK SERPL-CCNC: 53 U/L (ref 20–180)
CO2 SERPL-SCNC: 29 MMOL/L (ref 23–29)
CREAT SERPL-MCNC: 2.5 MG/DL (ref 0.5–1.4)
DIASTOLIC DYSFUNCTION: YES
DIFFERENTIAL METHOD: ABNORMAL
EOSINOPHIL # BLD AUTO: 0.4 K/UL (ref 0–0.5)
EOSINOPHIL NFR BLD: 5.4 % (ref 0–8)
ERYTHROCYTE [DISTWIDTH] IN BLOOD BY AUTOMATED COUNT: 13.5 % (ref 11.5–14.5)
EST. GFR  (AFRICAN AMERICAN): 22 ML/MIN/1.73 M^2
EST. GFR  (NON AFRICAN AMERICAN): 19 ML/MIN/1.73 M^2
ESTIMATED AVG GLUCOSE: 108 MG/DL (ref 68–131)
ESTIMATED PA SYSTOLIC PRESSURE: 31.52
GLUCOSE SERPL-MCNC: 97 MG/DL (ref 70–110)
HBA1C MFR BLD HPLC: 5.4 % (ref 4–5.6)
HCT VFR BLD AUTO: 34.5 % (ref 37–48.5)
HCV AB SERPL QL IA: NEGATIVE
HDLC SERPL-MCNC: 30 MG/DL (ref 40–75)
HDLC SERPL: 17.6 % (ref 20–50)
HGB BLD-MCNC: 11.1 G/DL (ref 12–16)
IMM GRANULOCYTES # BLD AUTO: 0.02 K/UL (ref 0–0.04)
IMM GRANULOCYTES NFR BLD AUTO: 0.3 % (ref 0–0.5)
LDLC SERPL CALC-MCNC: 69.4 MG/DL (ref 63–159)
LYMPHOCYTES # BLD AUTO: 2.4 K/UL (ref 1–4.8)
LYMPHOCYTES NFR BLD: 32.5 % (ref 18–48)
MCH RBC QN AUTO: 29 PG (ref 27–31)
MCHC RBC AUTO-ENTMCNC: 32.2 G/DL (ref 32–36)
MCV RBC AUTO: 90 FL (ref 82–98)
MITRAL VALVE REGURGITATION: ABNORMAL
MONOCYTES # BLD AUTO: 0.6 K/UL (ref 0.3–1)
MONOCYTES NFR BLD: 8.2 % (ref 4–15)
NEUTROPHILS # BLD AUTO: 3.9 K/UL (ref 1.8–7.7)
NEUTROPHILS NFR BLD: 52.3 % (ref 38–73)
NONHDLC SERPL-MCNC: 140 MG/DL
NRBC BLD-RTO: 0 /100 WBC
PLATELET # BLD AUTO: 191 K/UL (ref 150–350)
PMV BLD AUTO: 10.7 FL (ref 9.2–12.9)
POTASSIUM SERPL-SCNC: 3.6 MMOL/L (ref 3.5–5.1)
RBC # BLD AUTO: 3.83 M/UL (ref 4–5.4)
RETIRED EF AND QEF - SEE NOTES: 60 (ref 55–65)
SODIUM SERPL-SCNC: 140 MMOL/L (ref 136–145)
TRIGL SERPL-MCNC: 353 MG/DL (ref 30–150)
TROPONIN I SERPL DL<=0.01 NG/ML-MCNC: 0.01 NG/ML (ref 0–0.03)
TROPONIN I SERPL DL<=0.01 NG/ML-MCNC: 0.01 NG/ML (ref 0–0.03)
TROPONIN I SERPL DL<=0.01 NG/ML-MCNC: 0.02 NG/ML (ref 0–0.03)
WBC # BLD AUTO: 7.45 K/UL (ref 3.9–12.7)

## 2020-02-08 PROCEDURE — 21400001 HC TELEMETRY ROOM

## 2020-02-08 PROCEDURE — 93306 2D ECHO WITH COLOR FLOW DOPPLER: ICD-10-PCS | Mod: 26,,, | Performed by: INTERNAL MEDICINE

## 2020-02-08 PROCEDURE — 99223 PR INITIAL HOSPITAL CARE,LEVL III: ICD-10-PCS | Mod: ,,, | Performed by: PSYCHIATRY & NEUROLOGY

## 2020-02-08 PROCEDURE — 97530 THERAPEUTIC ACTIVITIES: CPT

## 2020-02-08 PROCEDURE — 84484 ASSAY OF TROPONIN QUANT: CPT

## 2020-02-08 PROCEDURE — 97162 PT EVAL MOD COMPLEX 30 MIN: CPT

## 2020-02-08 PROCEDURE — 97166 OT EVAL MOD COMPLEX 45 MIN: CPT

## 2020-02-08 PROCEDURE — 82553 CREATINE MB FRACTION: CPT

## 2020-02-08 PROCEDURE — 93010 ELECTROCARDIOGRAM REPORT: CPT | Mod: ,,, | Performed by: INTERNAL MEDICINE

## 2020-02-08 PROCEDURE — 93010 EKG 12-LEAD: ICD-10-PCS | Mod: ,,, | Performed by: INTERNAL MEDICINE

## 2020-02-08 PROCEDURE — 80048 BASIC METABOLIC PNL TOTAL CA: CPT

## 2020-02-08 PROCEDURE — 92611 MOTION FLUOROSCOPY/SWALLOW: CPT

## 2020-02-08 PROCEDURE — 85025 COMPLETE CBC W/AUTO DIFF WBC: CPT

## 2020-02-08 PROCEDURE — 92523 SPEECH SOUND LANG COMPREHEN: CPT

## 2020-02-08 PROCEDURE — 94640 AIRWAY INHALATION TREATMENT: CPT

## 2020-02-08 PROCEDURE — 83036 HEMOGLOBIN GLYCOSYLATED A1C: CPT

## 2020-02-08 PROCEDURE — A9698 NON-RAD CONTRAST MATERIALNOC: HCPCS | Performed by: FAMILY MEDICINE

## 2020-02-08 PROCEDURE — 97535 SELF CARE MNGMENT TRAINING: CPT

## 2020-02-08 PROCEDURE — 63600175 PHARM REV CODE 636 W HCPCS: Performed by: NURSE PRACTITIONER

## 2020-02-08 PROCEDURE — 25000003 PHARM REV CODE 250: Performed by: NURSE PRACTITIONER

## 2020-02-08 PROCEDURE — 25000242 PHARM REV CODE 250 ALT 637 W/ HCPCS: Performed by: INTERNAL MEDICINE

## 2020-02-08 PROCEDURE — 92610 EVALUATE SWALLOWING FUNCTION: CPT

## 2020-02-08 PROCEDURE — 25500020 PHARM REV CODE 255: Performed by: FAMILY MEDICINE

## 2020-02-08 PROCEDURE — 93005 ELECTROCARDIOGRAM TRACING: CPT

## 2020-02-08 PROCEDURE — 99223 1ST HOSP IP/OBS HIGH 75: CPT | Mod: ,,, | Performed by: PSYCHIATRY & NEUROLOGY

## 2020-02-08 PROCEDURE — 93306 TTE W/DOPPLER COMPLETE: CPT | Mod: 26,,, | Performed by: INTERNAL MEDICINE

## 2020-02-08 PROCEDURE — 36415 COLL VENOUS BLD VENIPUNCTURE: CPT

## 2020-02-08 PROCEDURE — 93306 TTE W/DOPPLER COMPLETE: CPT

## 2020-02-08 PROCEDURE — 82550 ASSAY OF CK (CPK): CPT

## 2020-02-08 RX ORDER — CLOPIDOGREL BISULFATE 75 MG/1
75 TABLET ORAL DAILY
Status: DISCONTINUED | OUTPATIENT
Start: 2020-02-08 | End: 2020-02-09 | Stop reason: HOSPADM

## 2020-02-08 RX ORDER — PANTOPRAZOLE SODIUM 40 MG/1
40 TABLET, DELAYED RELEASE ORAL DAILY
Status: DISCONTINUED | OUTPATIENT
Start: 2020-02-08 | End: 2020-02-09 | Stop reason: HOSPADM

## 2020-02-08 RX ORDER — OXYCODONE AND ACETAMINOPHEN 10; 325 MG/1; MG/1
1 TABLET ORAL EVERY 8 HOURS PRN
Status: DISCONTINUED | OUTPATIENT
Start: 2020-02-08 | End: 2020-02-09 | Stop reason: HOSPADM

## 2020-02-08 RX ORDER — IPRATROPIUM BROMIDE AND ALBUTEROL SULFATE 2.5; .5 MG/3ML; MG/3ML
3 SOLUTION RESPIRATORY (INHALATION) EVERY 4 HOURS PRN
Status: DISCONTINUED | OUTPATIENT
Start: 2020-02-08 | End: 2020-02-09 | Stop reason: HOSPADM

## 2020-02-08 RX ORDER — BUDESONIDE 0.5 MG/2ML
0.5 INHALANT ORAL EVERY 12 HOURS
Status: DISCONTINUED | OUTPATIENT
Start: 2020-02-08 | End: 2020-02-09 | Stop reason: HOSPADM

## 2020-02-08 RX ORDER — LABETALOL HYDROCHLORIDE 5 MG/ML
10 INJECTION, SOLUTION INTRAVENOUS EVERY 8 HOURS PRN
Status: DISCONTINUED | OUTPATIENT
Start: 2020-02-08 | End: 2020-02-08

## 2020-02-08 RX ORDER — BUDESONIDE AND FORMOTEROL FUMARATE DIHYDRATE 160; 4.5 UG/1; UG/1
2 AEROSOL RESPIRATORY (INHALATION) EVERY 12 HOURS
Status: DISCONTINUED | OUTPATIENT
Start: 2020-02-08 | End: 2020-02-08 | Stop reason: CLARIF

## 2020-02-08 RX ORDER — ARFORMOTEROL TARTRATE 15 UG/2ML
15 SOLUTION RESPIRATORY (INHALATION) 2 TIMES DAILY
Status: DISCONTINUED | OUTPATIENT
Start: 2020-02-08 | End: 2020-02-09 | Stop reason: HOSPADM

## 2020-02-08 RX ORDER — HYDRALAZINE HYDROCHLORIDE 20 MG/ML
10 INJECTION INTRAMUSCULAR; INTRAVENOUS EVERY 8 HOURS PRN
Status: DISCONTINUED | OUTPATIENT
Start: 2020-02-08 | End: 2020-02-09 | Stop reason: HOSPADM

## 2020-02-08 RX ORDER — ASPIRIN 325 MG
325 TABLET, DELAYED RELEASE (ENTERIC COATED) ORAL DAILY
Status: DISCONTINUED | OUTPATIENT
Start: 2020-02-08 | End: 2020-02-08

## 2020-02-08 RX ORDER — ATORVASTATIN CALCIUM 40 MG/1
40 TABLET, FILM COATED ORAL DAILY
Status: DISCONTINUED | OUTPATIENT
Start: 2020-02-08 | End: 2020-02-09 | Stop reason: HOSPADM

## 2020-02-08 RX ORDER — ALPRAZOLAM 1 MG/1
1 TABLET ORAL 2 TIMES DAILY
Status: DISCONTINUED | OUTPATIENT
Start: 2020-02-08 | End: 2020-02-09 | Stop reason: HOSPADM

## 2020-02-08 RX ORDER — ASPIRIN 81 MG/1
81 TABLET ORAL DAILY
Status: DISCONTINUED | OUTPATIENT
Start: 2020-02-09 | End: 2020-02-09 | Stop reason: HOSPADM

## 2020-02-08 RX ORDER — SODIUM CHLORIDE 0.9 % (FLUSH) 0.9 %
10 SYRINGE (ML) INJECTION
Status: DISCONTINUED | OUTPATIENT
Start: 2020-02-08 | End: 2020-02-09 | Stop reason: HOSPADM

## 2020-02-08 RX ADMIN — ALPRAZOLAM 1 MG: 1 TABLET ORAL at 09:02

## 2020-02-08 RX ADMIN — BARIUM SULFATE 25 G: 0.6 CREAM ORAL at 11:02

## 2020-02-08 RX ADMIN — OXYCODONE HYDROCHLORIDE AND ACETAMINOPHEN 1 TABLET: 10; 325 TABLET ORAL at 04:02

## 2020-02-08 RX ADMIN — ATORVASTATIN CALCIUM 40 MG: 40 TABLET, FILM COATED ORAL at 09:02

## 2020-02-08 RX ADMIN — HYDRALAZINE HYDROCHLORIDE 10 MG: 20 INJECTION INTRAMUSCULAR; INTRAVENOUS at 04:02

## 2020-02-08 RX ADMIN — CLOPIDOGREL BISULFATE 75 MG: 75 TABLET ORAL at 03:02

## 2020-02-08 RX ADMIN — Medication 25 G: at 11:02

## 2020-02-08 RX ADMIN — ARFORMOTEROL TARTRATE 15 MCG: 15 SOLUTION RESPIRATORY (INHALATION) at 08:02

## 2020-02-08 RX ADMIN — ARFORMOTEROL TARTRATE 15 MCG: 15 SOLUTION RESPIRATORY (INHALATION) at 07:02

## 2020-02-08 RX ADMIN — PANTOPRAZOLE SODIUM 40 MG: 40 TABLET, DELAYED RELEASE ORAL at 09:02

## 2020-02-08 RX ADMIN — BUDESONIDE 0.5 MG: 0.5 SUSPENSION RESPIRATORY (INHALATION) at 07:02

## 2020-02-08 RX ADMIN — OXYCODONE HYDROCHLORIDE AND ACETAMINOPHEN 1 TABLET: 10; 325 TABLET ORAL at 09:02

## 2020-02-08 RX ADMIN — OXYCODONE HYDROCHLORIDE AND ACETAMINOPHEN 1 TABLET: 10; 325 TABLET ORAL at 01:02

## 2020-02-08 RX ADMIN — BUDESONIDE 0.5 MG: 0.5 SUSPENSION RESPIRATORY (INHALATION) at 08:02

## 2020-02-08 RX ADMIN — ASPIRIN 325 MG: 325 TABLET, DELAYED RELEASE ORAL at 09:02

## 2020-02-08 NOTE — PLAN OF CARE
Patient currently requires min assist for bed mobility, transfers with RW, and ambulation with RW approx 30 feet.

## 2020-02-08 NOTE — PLAN OF CARE
CM went to pt room to discuss discharge planning. Pt is not currently in the room. Limited Information obtained from pt sister, Farideh Lim.  Pt does not use any Home medical equipment. Transportation home will be either Farideh or kika Lares or Joy.  Pharmacy is [facility]. Home address verified as correct.      1600. Pt has returned to room. Pt is awake, oriented, alert and able to make needs known. Pt verifies information provided by sister as correct. Pt is hesitant to go to SNF.  HH and SNF list given to the pt and family.  Advised to research the companies and make a decision of top 3. Will need to sign choice form once decision is made.          02/08/20 1435   Discharge Assessment   Assessment Type Discharge Planning Assessment   Confirmed/corrected address and phone number on facesheet? Yes   Assessment information obtained from? Other  (Sister, Farideh Lim)   Expected Length of Stay (days)   (TBD)   Prior to hospitilization cognitive status: Alert/Oriented   Prior to hospitalization functional status: Independent   Lives With alone   Who are your caregiver(s) and their phone number(s)? kika lares and joy [phone]   Patient's perception of discharge disposition admitted as an inpatient   Patient currently being followed by outpatient case management? No   Patient currently receives any other outside agency services? No   Equipment Currently Used at Home none   Does the patient have transportation home? Yes   Transportation Anticipated family or friend will provide   Does the patient receive services at the [facility]? No   Discharge Plan A   (TBD)   DME Needed Upon Discharge    (TBD)       [facility] - [place], [place] - [address]  [address]  [place] [address]  Phone: [phone] Fax: [phone]    [facility] - [address]  [address]  [place] [address]  Phone: [phone] Fax: [phone]

## 2020-02-08 NOTE — SUBJECTIVE & OBJECTIVE
Woke up with symptoms?: no    Recent bleeding noted: no  Does the patient take any Blood Thinners? no  Medications: Antiplatelets:  aspirin      Past Medical History: hypertension, hyperlipidemia and CHF    Past Surgical History: no major surgeries within the last 2 weeks    Family History: no relevant history    Social History: no smoking, no drinking, no drugs    Allergies: No Known Allergies No known drug allergies    Review of Systems   Constitutional: Negative for chills, diaphoresis and fever.   HENT: Negative for hearing loss, tinnitus and trouble swallowing.    Eyes: Negative for visual disturbance.   Respiratory: Negative for shortness of breath.    Cardiovascular: Negative for chest pain and palpitations.   Gastrointestinal: Negative for vomiting.   Endocrine: Negative for cold intolerance.   Genitourinary: Negative for hematuria.   Musculoskeletal: Negative for neck pain and neck stiffness.   Skin: Negative for rash.   Allergic/Immunologic: Negative for immunocompromised state.   Neurological: Positive for facial asymmetry, speech difficulty and numbness. Negative for dizziness and weakness.   Hematological: Does not bruise/bleed easily.   Psychiatric/Behavioral: Negative for agitation, behavioral problems and confusion.     Objective:   Vitals: Blood pressure (!) 213/85, pulse (!) 54, temperature 98.7 °F (37.1 °C), temperature source Oral, resp. rate 16, SpO2 96 %. BP: 207/81, Respiratory Rate: 17 and Heart Rate: 78    CT READ: Yes  No hemmorhage. No mass effect. No early infarct signs.     Physical Exam   Constitutional: She is oriented to person, place, and time. She appears well-developed and well-nourished.   HENT:   Head: Normocephalic and atraumatic.   Eyes: Pupils are equal, round, and reactive to light. EOM are normal.   Neck: Normal range of motion. Neck supple.   Cardiovascular: Normal rate and regular rhythm.   Pulmonary/Chest: No respiratory distress.   Abdominal: She exhibits no mass.    Genitourinary:   Genitourinary Comments: No performed   Musculoskeletal: Normal range of motion. She exhibits no edema or deformity.   Neurological: She is alert and oriented to person, place, and time. A cranial nerve deficit and sensory deficit is present.   Skin: No rash noted. She is not diaphoretic. No erythema.   Psychiatric: She has a normal mood and affect. Her behavior is normal.   Nursing note and vitals reviewed.

## 2020-02-08 NOTE — PT/OT/SLP EVAL
"Speech Language Pathology Evaluation  Cognitive/Bedside Swallow    Patient Name:  Sheryl Falcon   MRN:  [id number]  Admitting Diagnosis: Facial droop    Recommendations:                  General Recommendations:  Speech/language therapy  Diet recommendations:  Regular, Full liquids   Aspiration Precautions: Standard aspiration precautions   General Precautions: Standard,    Communication strategies:  none    History:     Past Medical History:   Diagnosis Date    Cervical cancer     Chronic kidney disease (CKD), stage II (mild)     COPD (chronic obstructive pulmonary disease)     GERD (gastroesophageal reflux disease)     Hypertension     Liver disease     Lumbar herniated disc     Renal disorder     Thyroid disease        Past Surgical History:   Procedure Laterality Date    CAROTID ENDARTERECTOMY      CHOLECYSTECTOMY      HYSTERECTOMY      PARATHYROIDECTOMY      TONSILLECTOMY         Social History: Patient lives with grand- daughter.    Modified Barium Swallow:  A Modified Barium Swallow was conducted to assess oropharyngeal swallow function. Pt tolerated thin liquids via cup rim, nectar thickened liquids via cup rim, pureed via teaspoon, and a bianca cracker. Pt presented with adequate mastication and A-P transport of all consistencies. Delayed initiation of pharyngeal swallow and premature spillage into valleculae was noted, which was cleared following completion of pharyngeal swallow. No penetration or aspiration was noted     Occupation/hobbies/homemaking: retired    Subjective     Pt alert and cooperative.  Patient goals: "To go home"    Pain/Comfort:  ·  Pt reported lower back pain, which was improved with positioning.     Objective:     Cognitive Status:    Attention No obvious deficits observed WFL  Orientation Oriented x4  Problem Solving Conclusions WFL  Safety awareness WFL  Reasoning Cause/effect comment WFL      Receptive Language:   Comprehension:      [facility]    Pragmatics:  "   WFL    Expressive Language:  Verbal:    Automatic Speech  Days of the week WFL and Months of the year WFL  Conversational speech WFL     Motor Speech:  Dysarthria mild deficits  Phonation moderate deficits    Voice:   Quality Hoarse, Rough and Breathy    Visual-Spatial:  WFL    Reading:   not assessed     Written Expression:   not assessed    Oral Musculature Evaluation  · Oral Musculature: facial asymmetry present, left weakness  · Dentition: present and adequate  · Mucosal Quality: good  · Mandibular Strength and Mobility: WFL  · Oral Labial Strength and Mobility: WFL  · Lingual Strength and Mobility: impaired strength, impaired left lateral movement  · Velar Elevation: WFL  · Buccal Strength and Mobility: WFL    Bedside Swallow Eval:   Consistencies Assessed:  · Thin liquids water via cup rim  · [place] thick liquids apple juice via cup rim  · Puree pudding via teaspoon  · Solids bianca cracker     Oral Phase:   · WNL    Pharyngeal Phase:   · delayed swallow initation  · no overt clinical signs/symptoms of aspiration  · throat clearing    Compensatory Strategies  · None    Treatment: ST assessed speech/ language, cognition, and swallow function.     Assessment:     Sheryl Falcon is a 64 y.o. female with an SLP diagnosis of Dysarthria and Dysphonia.  She presents with decreased intelligibility during speech production.    Goals:   Multidisciplinary Problems     SLP Goals        Problem: SLP Goal    Goal Priority Disciplines Outcome   SLP Goal     SLP    Description:  1. Complete oral motor exercises to improve oral motor strength/ coordination.  2. Improve sustained phonation during speech production with use of compensatory strategies.   3. Improve speech production with use of compensatory strategies.                       Plan:     · Patient to be seen:  3 x/week   · Plan of Care expires:  02/14/20  · Plan of Care reviewed with:  patient, daughter   · SLP Follow-Up:     yes     Discharge recommendations:   Discharge Facility/Level of Care Needs: home health speech therapy   Barriers to Discharge:  Level of Skilled Assistance Needed mod A    Time Tracking:     SLP Treatment Date:   02/08/20  Speech Start Time:  1030  Speech Stop Time:  1130     Speech Total Time (min):  60 min    Billable Minutes: Eval 25 , Eval Swallow and Oral Function 15 and Motion Fluoro Swallow, Cine/Vid 20    Serina Rasmussen CCC-SLP  02/08/2020

## 2020-02-08 NOTE — ASSESSMENT & PLAN NOTE
CVA vs TIA   CT head negative for acute findings   Neuro checks q4 hours   MRI Brain without contrast  MRA Brain and neck  lipid panel, Hgb A1C,   ASA  325 mg po daily and statin   Allow Permissive HTN  PT/OT/ST.   Add Plavix to ASA x 21 days if MRI confirms stroke.   Neurology consult in am

## 2020-02-08 NOTE — ASSESSMENT & PLAN NOTE
"Tobacco abuse  -smoking cessation counseling  -nicotine patch  -"Patient was counseled on smoking cessation for 10 minutes, RX for nicotine patch offered, patient's daughter states unable to quit".        "

## 2020-02-08 NOTE — SUBJECTIVE & OBJECTIVE
Past Medical History:   Diagnosis Date    Cervical cancer     Chronic kidney disease (CKD), stage II (mild)     COPD (chronic obstructive pulmonary disease)     GERD (gastroesophageal reflux disease)     Hypertension     Liver disease     Lumbar herniated disc     Renal disorder     Thyroid disease        Past Surgical History:   Procedure Laterality Date    CAROTID ENDARTERECTOMY      CHOLECYSTECTOMY      HYSTERECTOMY      PARATHYROIDECTOMY      TONSILLECTOMY         Review of patient's allergies indicates:  No Known Allergies    No current facility-administered medications on file prior to encounter.      Current Outpatient Medications on File Prior to Encounter   Medication Sig    albuterol sulfate 90 mcg/actuation AePB Inhale 1 puff into the lungs every 4 (four) hours as needed.    alprazolam (XANAX) 1 MG tablet Take 1 mg by mouth 2 (two) times daily.    amlodipine (NORVASC) 10 MG tablet Take 10 mg by mouth once daily.    aspirin (ECOTRIN) 81 MG EC tablet Take 81 mg by mouth once daily.    atorvastatin (LIPITOR) 20 MG tablet Take 20 mg by mouth every evening.     budesonide-formoterol 160-4.5 mcg (SYMBICORT) 160-4.5 mcg/actuation HFAA Inhale 2 puffs into the lungs every 12 (twelve) hours.    cholecalciferol, vitamin D3, (VITAMIN D3 ORAL) Take 5,000 Units by mouth once daily.    cloNIDine (CATAPRES) 0.1 MG tablet Take 0.1 mg by mouth once.    cyclobenzaprine (FLEXERIL) 10 MG tablet Take 1 tablet (10 mg total) by mouth 3 (three) times daily as needed for Muscle spasms.    cyclobenzaprine (FLEXERIL) 5 MG tablet Take 5 mg by mouth 2 (two) times daily.    ergocalciferol (ERGOCALCIFEROL) 50,000 unit Cap Take 1 capsule (50,000 Units total) by mouth every 7 days.    ergocalciferol (ERGOCALCIFEROL) 50,000 unit Cap Take 50,000 Units by mouth every 7 days.    fentaNYL (DURAGESIC) 25 mcg/hr Place 1 patch onto the skin every 72 hours.    fentaNYL (DURAGESIC) 25 mcg/hr Place 1 patch onto the skin  every 72 hours.    gabapentin (NEURONTIN) 300 MG capsule Take 300 mg by mouth 3 (three) times daily.    hydrALAZINE (APRESOLINE) 50 MG tablet Take 50 mg by mouth 3 (three) times daily.    hydrochlorothiazide (HYDRODIURIL) 12.5 MG Tab Take 12.5 mg by mouth once daily.    losartan (COZAAR) 100 MG tablet Take 100 mg by mouth once daily.    metoprolol succinate (TOPROL-XL) 100 MG 24 hr tablet Take 100 mg by mouth once daily.    oxycodone-acetaminophen (PERCOCET)  mg per tablet Take 1 tablet by mouth 3 (three) times daily as needed for Pain.    oxycodone-acetaminophen (PERCOCET)  mg per tablet Take 1 tablet by mouth every 8 (eight) hours as needed for Pain.    pantoprazole (PROTONIX) 40 MG tablet Take 40 mg by mouth once daily.    promethazine (PHENERGAN) 25 MG tablet Take 25 mg by mouth.    zolpidem (AMBIEN) 10 mg Tab Take 5 mg by mouth nightly as needed.     Family History     None        Tobacco Use    Smoking status: Current Every Day Smoker     Packs/day: 1.00     Years: 32.00     Pack years: 32.00     Types: Cigarettes    Smokeless tobacco: Never Used   Substance and Sexual Activity    Alcohol use: No    Drug use: No    Sexual activity: Not on file     Review of Systems   Constitutional: Negative.  Negative for activity change, appetite change, fatigue and fever.   HENT: Negative.    Eyes: Negative.    Respiratory: Negative.  Negative for shortness of breath.    Cardiovascular: Negative.  Negative for chest pain, palpitations and leg swelling.   Gastrointestinal: Negative.    Endocrine: Negative.    Genitourinary: Negative.  Negative for dysuria.   Musculoskeletal: Positive for back pain (chronic ).   Skin: Negative.    Allergic/Immunologic: Negative.    Neurological: Positive for facial asymmetry, speech difficulty and numbness. Negative for dizziness, syncope, weakness and headaches.   Hematological: Negative.    Psychiatric/Behavioral: Negative.      Objective:     Vital Signs (Most  Recent):  Temp: 98.7 °F (37.1 °C) (02/08/20 0229)  Pulse: (!) 49 (02/08/20 0229)  Resp: 17 (02/08/20 0229)  BP: (!) 180/68 (02/08/20 0229)  SpO2: (!) 92 % (02/08/20 0229) Vital Signs (24h Range):  Temp:  [98.7 °F (37.1 °C)] 98.7 °F (37.1 °C)  Pulse:  [45-56] 49  Resp:  [16-18] 17  SpO2:  [88 %-97 %] 92 %  BP: (152-230)/(64-91) 180/68     Weight: 59.4 kg (130 lb 15.3 oz)  Body mass index is 21.79 kg/m².    Physical Exam   Constitutional: She is oriented to person, place, and time. She appears well-developed and well-nourished.   HENT:   Head: Normocephalic and atraumatic.   Eyes: Pupils are equal, round, and reactive to light. Conjunctivae and EOM are normal.   Neck: Normal range of motion. Neck supple.   Cardiovascular: Normal rate, regular rhythm, normal heart sounds and intact distal pulses.   Pulmonary/Chest: Effort normal and breath sounds normal.   Abdominal: Soft. Bowel sounds are normal.   Musculoskeletal: Normal range of motion.   Neurological: She is alert and oriented to person, place, and time. She has normal reflexes.   Patient is alert and oriented to person, place and time. Pupils ERRL and EOM normal.  Strength is full bilaterally; it is equal and 5/5 in bilateral upper and lower extremities. There is no pronator drift of outstretched arms. Light touch sense is intact. Speech is clear and normal. Mild left side facial droop.    Skin: Skin is warm and dry.   Psychiatric: She has a normal mood and affect. Her behavior is normal. Judgment and thought content normal.   Nursing note and vitals reviewed.       Significant Labs:   BMP:   Recent Labs   Lab 02/08/20  0306   GLU 97      K 3.6      CO2 29   BUN 41*   CREATININE 2.5*   CALCIUM 9.0     CBC:   Recent Labs   Lab 02/07/20  2241 02/08/20  0306   WBC 9.35 7.45   HGB 12.0 11.1*   HCT 36.7* 34.5*    191     CMP:   Recent Labs   Lab 02/07/20  2241 02/08/20  0306    140   K 4.1 3.6    102   CO2 26 29   * 97   BUN 42*  41*   CREATININE 2.6* 2.5*   CALCIUM 9.0 9.0   PROT 6.8  --    ALBUMIN 3.7  --    BILITOT 0.3  --    ALKPHOS 71  --    AST 17  --    ALT 8*  --    ANIONGAP 11 9   EGFRNONAA 18.5* 19*     Cardiac Markers: No results for input(s): CKMB, MYOGLOBIN, BNP, TROPISTAT in the last 48 hours.  Lipid Panel: No results for input(s): CHOL, HDL, LDLCALC, TRIG, CHOLHDL in the last 48 hours.  Troponin:   Recent Labs   Lab 02/08/20  0306   TROPONINI 0.016     TSH:   Recent Labs   Lab 02/07/20  2241   TSH 3.461     All pertinent labs within the past 24 hours have been reviewed.    Significant Imaging:   Imaging Results          X-Ray Chest AP Portable (Final result)  Result time 02/07/20 23:05:38    Final result by Wilton Gonsalez MD (02/07/20 23:05:38)                 Impression:      No acute findings.      Electronically signed by: Wilton Gonsalez  Date:    02/07/2020  Time:    23:05             Narrative:    EXAMINATION:  XR CHEST AP PORTABLE    CLINICAL HISTORY:  Stroke;    COMPARISON:  09/25/2018    FINDINGS:  Lung fields are clear.  The heart is borderline in size.  The aorta is atherosclerotic.    No pleural fluid or pneumothorax.    No adenopathy is identified.    No significant osseous abnormality.                               CT Head Without Contrast (Final result)  Result time 02/07/20 22:41:36    Final result by Wilton Gonsalez MD (02/07/20 22:41:36)                 Impression:      No acute findings.    All CT scans at this facility are performed  using dose modulation techniques as appropriate to performed exam including the following:  automated exposure control; adjustment of mA and/or kV according to the patients size (this includes techniques or standardized protocols for targeted exams where dose is matched to indication/reason for exam: i.e. extremities or head);  iterative reconstruction technique.      Electronically signed by: Wilton Gonsalez  Date:    02/07/2020  Time:    22:41             Narrative:    EXAMINATION:  CT  HEAD WITHOUT CONTRAST    CLINICAL HISTORY:  Speech changes (or aphasia), new or progressive;    FINDINGS:  No intracranial hemorrhage or acute findings are demonstrated. The visualized paranasal sinuses are clear. The calvarium is intact.

## 2020-02-08 NOTE — PT/OT/SLP EVAL
"Occupational Therapy   Evaluation    Name: Sheryl Falcon  MRN: [id number]  Admitting Diagnosis:  Acute ischemic cerebrovascular accident (CVA) involving middle cerebral artery territory      Recommendations:     Discharge Recommendations: home health OT, outpatient OT  Discharge Equipment Recommendations:  bath bench, walker, rolling  Barriers to discharge:  None    Assessment:     Sheryl Falcon is a 64 y.o. female with a medical diagnosis of Acute ischemic cerebrovascular accident (CVA) involving middle cerebral artery territory.  She presents with SELF CARE DEBILITY. Performance deficits affecting function: weakness, impaired endurance, impaired self care skills, impaired functional mobilty, gait instability, impaired balance, decreased coordination, decreased upper extremity function, decreased safety awareness, decreased ROM.      Rehab Prognosis: Good; patient would benefit from acute skilled OT services to address these deficits and reach maximum level of function.       Plan:     Patient to be seen 3 x/week to address the above listed problems via self-care/home management, therapeutic activities, therapeutic exercises  · Plan of Care Expires: 02/15/20  · Plan of Care Reviewed with: patient, daughter    Subjective     Chief Complaint: C/O BACK PAIN WITH "6 BULGING DISCS"  Patient/Family Comments/goals: DTR C/O PATIENT NEED FOR ETTB TO INCREASE SAFETY WITH TUB T/F'S.    Occupational Profile:  Living Environment: 17 Y.O. GRANDDTR LIVES WITH PATIENT.  Previous level of function: PATIENT WAS (I) WITH ADL'S, T/F'S AND IADL'S.  Roles and Routines: PATIENT WAS (I) WITH ADL'S, T/F'S AND IADL'S.  Equipment Used at Home:  none  Assistance upon Discharge: FAMILY    Pain/Comfort:  · Pain Rating 1: 7/10  · Location - Orientation 1: lower  · Location 1: back  · Pain Addressed 1: Reposition, Distraction    Patients cultural, spiritual, [employer] conflicts given the current situation: no    Objective:     Communicated with: " NURSE prior to session.  Patient found left sidelying with peripheral IV, telemetry upon OT entry to room.    General Precautions: Standard, fall   Orthopedic Precautions:N/A   Braces: N/A     Occupational Performance:    Bed Mobility:    · Patient completed Rolling/Turning to Right with minimum assistance  · Patient completed Scooting/Bridging with minimum assistance  · Patient completed Supine to Sit with minimum assistance    Functional Mobility/Transfers:  · Patient completed Sit <> Stand Transfer with minimum assistance  with  rolling walker   · Patient completed Bed <> Chair Transfer using Stand Pivot technique with minimum assistance with rolling walker  · Patient completed Toilet Transfer Stand Pivot technique with minimum assistance with  rolling walker and grab bars  · Functional Mobility: MIN (A) WITH RW    Activities of Daily Living:  · Upper Body Dressing: maximal assistance WITH ROBE  · Lower Body Dressing: maximal assistance WITH SOCKS  · Toileting: minimum assistance WITH CLOTHING MANAGEMENT    Cognitive/Visual Perceptual:  NT    Physical Exam:  Balance:    -       MIN (A) WITH RW  Postural examination/scapula alignment:    -       No postural abnormalities identified  Skin integrity: Visible skin intact  Upper Extremity Range of Motion:     -       Right Upper Extremity: Deficits: PATIENT WITH DECREASED RIGHT SHLD FLEX PREMORBIDLY  -       Left Upper Extremity: WFL  Upper Extremity Strength:    -       Right Upper Extremity: Deficits: DECREASED (R) SHLD STRENGTH  -       Left Upper Extremity: WFL  Fine Motor Coordination:    -       Impaired  LUE    AMPAC 6 Click ADL:  AMPAC Total Score: 15    Treatment & Education:  OT EVAL COMPLETED. PATIENT AND DTR EDUCATED RE: PURPOSE OF OT.  PATIENT EDUCATED RE:  CALL--DON'T FALL.  Education:    Patient left up in chair with all lines intact, call button in reach and NURSE AND DTR present    GOALS:   Multidisciplinary Problems     Occupational Therapy Goals         Problem: Occupational Therapy Goal    Goal Priority Disciplines Outcome Interventions   Occupational Therapy Goal     OT, PT/OT Ongoing, Progressing    Description:  1)  PATIENT WILL BE REQUIRE MIN VC/DEMO FOR PROPER PERFORMANCE OF LUE GM/FM COORD/STRENGTHENING HEP.  2)  PATIENT WILL BE MIN (A) WITH UB DRESSING.  3)  PATIENT WILL BE CGA WITH TOILET T/F.  4)  PATIENT WILL BE MIN (A) WITH LB DRESSING.                    History:     Past Medical History:   Diagnosis Date    Cervical cancer     Chronic kidney disease (CKD), stage II (mild)     COPD (chronic obstructive pulmonary disease)     GERD (gastroesophageal reflux disease)     Hypertension     Liver disease     Lumbar herniated disc     PAD (peripheral artery disease)     Renal disorder     Thyroid disease        Past Surgical History:   Procedure Laterality Date    CAROTID ENDARTERECTOMY      CHOLECYSTECTOMY      HYSTERECTOMY      PARATHYROIDECTOMY      peripheral surgery      TONSILLECTOMY         Time Tracking:     OT Date of Treatment: 02/08/20  OT Start Time: 0858  OT Stop Time: 0925  OT Total Time (min): 27 min    Billable Minutes:Evaluation 15  Self Care/Home Management 12    Jesica Harris OT  2/8/2020

## 2020-02-08 NOTE — CONSULTS
Weekend dietitian covering remotely. In-house RD to follow up Monday 2/10 with Cardiac education.

## 2020-02-08 NOTE — CONSULTS
"      [facility]  Vascular Neurology  Comprehensive Stroke Center  Tele-Consultation Note      Consults    Consulting Provider: KERVIN BELL  Current Providers  No providers found    Patient Location:  [facility] EMERGENCY DEPARTMENT Emergency Department  Spoke hospital nurse at bedside with patient assisting consultant.     Patient information was obtained from patient and ED staff.         Assessment/Plan:   65 y/o with HTN, HLD, CHF, presents with complains of L face droop, L arm and leg numbness, and trouble expressing herself. Had had similar symptoms before.  Said that earlier today she : walking, started having a funny feeling in my L face, tongue, and L arm" and couldn't find her words and speak normally.  NIHSS 3, CTH without acute abnormality.  Has several risk factors for stroke and thus a small acute L subcortical infarct must be considered. Unfortunately, she is out of the window for treatment with iv alteplase. LVO is unlikely and therefore no urgent vascular imaging needed.  Recommend admission, MRI/MRA brain, CUS, TTE, lipid profile, hemoglobin A1c.  Add Plavix to ASA x 21 days if MRI confirms stroke. Neurology consult in am.          STROKE DOCUMENTATION     Acute Stroke Times:   Acute Stroke Times   Last Known Normal Date: 02/07/20  Last Known Normal Time: 1730  Symptom Onset Date: 02/07/20  Symptom Onset Time: 1730  Stroke Team Called Date: 02/07/20  Stroke Team Called Time: 2236  Stroke Team Arrival Date: 02/07/20  Stroke Team Arrival Time: 2241  CT Interpretation Time: 2241  Decision to Treat Time for Alteplase: (No iv alteplase)  Decision to Treat Time for IR: (No IR)    NIH Scale:  Interval: baseline(Simultaneous filing. User may not have seen previous data.)  1a. Level of Consciousness: 0-->Alert, keenly responsive(Simultaneous filing. User may not have seen previous data.)  1b. LOC Questions: 0-->Answers both questions correctly(Simultaneous filing. User may not " have seen previous data.)  1c. LOC Commands: 0-->Performs both tasks correctly(Simultaneous filing. User may not have seen previous data.)  2. Best Gaze: 0-->Normal(Simultaneous filing. User may not have seen previous data.)  3. Visual: 0-->No visual loss(Simultaneous filing. User may not have seen previous data.)  4. Facial Palsy: 2-->Partial paralysis (total or near-total paralysis of lower face)(Simultaneous filing. User may not have seen previous data.)  5a. Motor Arm, Left: 0-->No drift, limb holds 90 (or 45) degrees for full 10 secs(Simultaneous filing. User may not have seen previous data.)  5b. Motor Arm, Right: 0-->No drift, limb holds 90 (or 45) degrees for full 10 secs(Simultaneous filing. User may not have seen previous data.)  6a. Motor Leg, Left: 0-->No drift, leg holds 30 degree position for full 5 secs(Simultaneous filing. User may not have seen previous data.)  6b. Motor Leg, Right: 0-->No drift, leg holds 30 degree position for full 5 secs(Simultaneous filing. User may not have seen previous data.)  7. Limb Ataxia: 0-->Absent(Simultaneous filing. User may not have seen previous data.)  8. Sensory: 1-->Mild-to-moderate sensory loss, patient feels pinprick is less sharp or is dull on the affected side, or there is a loss of superficial pain with pinprick, but patient is aware of being touched(Simultaneous filing. User may not have seen previous data.)  9. Best Language: 0-->No aphasia, normal(Simultaneous filing. User may not have seen previous data.)  10. Dysarthria: 0-->Normal(Simultaneous filing. User may not have seen previous data.)  11. Extinction and Inattention (formerly Neglect): 0-->No abnormality(Simultaneous filing. User may not have seen previous data.)  Total (NIH Stroke Scale): 3     Modified Jennifer Score: 0  [place] Coma Scale:15   ABCD2 Score:    [id number] Score:   HAS -BLED Score:   ICH Score:   Hunt & Georges Classification:       Diagnoses: L face weakness. L sided numbness.  No  "new Assessment & Plan notes have been filed under this hospital service since the last note was generated.  Service: Vascular Neurology      Blood pressure (!) 213/85, pulse (!) 54, temperature 98.7 °F (37.1 °C), temperature source Oral, resp. rate 16, SpO2 96 %.  Alteplase Eligible?: No  Alteplase Recommendation: Alteplase not recommended due to Outside of treatment window   Possible Interventional Revascularization Candidate? No; No significant neurological deficit    Disposition Recommendation: admit to inpatient    Subjective:     History of Present Illness: 63 y/o with HTN, HLD, CHF, presents with complains of L face droop, L arm and leg numbness, and trouble expressing herself. Had had similar symptoms before.  Said that earlier today she : walking, started having a funny feeling in my L face, tongue, and L arm" and couldn't find her words and speak normally.  Improving.          No notes on file      Woke up with symptoms?: no    Recent bleeding noted: no  Does the patient take any Blood Thinners? no  Medications: Antiplatelets:  aspirin      Past Medical History: hypertension, hyperlipidemia and CHF    Past Surgical History: no major surgeries within the last 2 weeks    Family History: no relevant history    Social History: no smoking, no drinking, no drugs    Allergies: No Known Allergies No known drug allergies    Review of Systems   Constitutional: Negative for chills, diaphoresis and fever.   HENT: Negative for hearing loss, tinnitus and trouble swallowing.    Eyes: Negative for visual disturbance.   Respiratory: Negative for shortness of breath.    Cardiovascular: Negative for chest pain and palpitations.   Gastrointestinal: Negative for vomiting.   Endocrine: Negative for cold intolerance.   Genitourinary: Negative for hematuria.   Musculoskeletal: Negative for neck pain and neck stiffness.   Skin: Negative for rash.   Allergic/Immunologic: Negative for immunocompromised state.   Neurological: Positive " for facial asymmetry, speech difficulty and numbness. Negative for dizziness and weakness.   Hematological: Does not bruise/bleed easily.   Psychiatric/Behavioral: Negative for agitation, behavioral problems and confusion.     Objective:   Vitals: Blood pressure (!) 213/85, pulse (!) 54, temperature 98.7 °F (37.1 °C), temperature source Oral, resp. rate 16, SpO2 96 %. BP: 207/81, Respiratory Rate: 17 and Heart Rate: 78    CT READ: Yes  No hemmorhage. No mass effect. No early infarct signs.     Physical Exam   Constitutional: She is oriented to person, place, and time. She appears well-developed and well-nourished.   HENT:   Head: Normocephalic and atraumatic.   Eyes: Pupils are equal, round, and reactive to light. EOM are normal.   Neck: Normal range of motion. Neck supple.   Cardiovascular: Normal rate and regular rhythm.   Pulmonary/Chest: No respiratory distress.   Abdominal: She exhibits no mass.   Genitourinary:   Genitourinary Comments: No performed   Musculoskeletal: Normal range of motion. She exhibits no edema or deformity.   Neurological: She is alert and oriented to person, place, and time. A cranial nerve deficit and sensory deficit is present.   Skin: No rash noted. She is not diaphoretic. No erythema.   Psychiatric: She has a normal mood and affect. Her behavior is normal.   Nursing note and vitals reviewed.            Recommended the emergency room physician to have a brief discussion with the patient and/or family if available regarding the risks and benefits of treatment, and to briefly document the occurrence of that discussion in his clinical encounter note.     The attending portion of this evaluation, treatment, and documentation was performed per Zackery Ontiveros MD via audiovisual.    Billing code:  (non-intervention mild to moderate stroke, TIA, some mimics)    · This patient has a critical neurological condition/illness, with some potential for high morbidity and mortality.  · There is  a moderate probability for acute neurological change leading to clinical and possibly life-threatening deterioration requiring highest level of physician preparedness for urgent intervention.  · Care was coordinated with other physicians involved in the patient's care.  · Radiologic studies and laboratory data were reviewed and interpreted, and plan of care was re-assessed based on the results.  · Diagnosis, treatment options and prognosis may have been discussed with the patient and/or family members or caregiver.      In your opinion, this was a: Tier 2 Van Negative    Consult End Time: 1050 pm    Zackery Ontiveros MD  Comprehensive Stroke Center  Vascular Neurology   [facility]

## 2020-02-08 NOTE — HOSPITAL COURSE
During admission, she was bradycardic 40-50's. Needs outpatient Cardiology consult. Difficulty swallowing and speaking improved. Failed bedside swallow eval by Speech. Modified barium swallow ordered and was negative for aspiration: regular foods and thin liquids OK. MRI brain showed Several small acute cortical infarcts. MRA brain and neck negative. Neurology consult stated stable for discharge. Plan for better B/P control, statin, and smoking cessation counseling.  ECHO showed EF 60% with diastolic dysfunction, Pul HTN. Patient was counseled on smoking cessation for 10 minutes, RX for nicotine patch offered, patient's daughter states unable to quit. Free Smoking cessation classes offered and referral sent. Home Health arranged for SN/OT/PT/SN. Patient seen and examined and deemed stable for d/c.

## 2020-02-08 NOTE — ASSESSMENT & PLAN NOTE
Acute Middle cerebral artery CVA  -CT head negative for acute findings   Neuro checks q4 hours   -MRI Brain without contrast: Several small acute cortical infarcts   -MRA Brain and neck: No large vessel occlusion  lipid panel pending  - Hgb A1C pending  ASA  325 mg po daily and statin   Allow Permissive HTN  PT/OT/ST.   Add Plavix to ASA x 21 days if MRI confirms stroke.   Neurology consult: Dr. Baron  Failed bedside swallow: PASSED Modified Barium Swallow: solid foods and thin liquids

## 2020-02-08 NOTE — SUBJECTIVE & OBJECTIVE
Interval History: Failed bedside swallow by ST. Modified barium swallow showed NO aspiration: solid foods and Thin liquids. Consult to Neurology pending.     ECHO 2/8/20   CONCLUSIONS     1 - Biatrial enlargement.     2 - Concentric hypertrophy.     3 - No wall motion abnormalities.     4 - Normal left ventricular systolic function (EF 60-65%).     5 - Impaired LV relaxation, elevated LAP (grade 2 diastolic dysfunction).     6 - Normal right ventricular systolic function .     7 - The estimated PA systolic pressure is 32 mmHg.     8 - Mild mitral regurgitation.     Review of Systems   Constitutional: Positive for activity change, appetite change and fatigue. Negative for fever.   HENT: Negative.    Eyes: Negative.    Respiratory: Negative.  Negative for shortness of breath.    Cardiovascular: Negative.  Negative for chest pain, palpitations and leg swelling.   Gastrointestinal: Negative.    Endocrine: Negative.    Genitourinary: Negative.  Negative for dysuria.   Musculoskeletal: Positive for back pain (chronic ).   Skin: Negative.    Allergic/Immunologic: Negative.    Neurological: Positive for facial asymmetry, speech difficulty, weakness (left leg, left arm) and numbness. Negative for dizziness, syncope and headaches.   Hematological: Negative.    Psychiatric/Behavioral: Positive for dysphoric mood.     Objective:     Vital Signs (Most Recent):  Temp: 98.5 °F (36.9 °C) (02/08/20 0718)  Pulse: (!) 47 (02/08/20 1100)  Resp: 18 (02/08/20 0817)  BP: (!) 156/66 (02/08/20 0718)  SpO2: 97 % (02/08/20 0817) Vital Signs (24h Range):  Temp:  [98.4 °F (36.9 °C)-98.7 °F (37.1 °C)] 98.5 °F (36.9 °C)  Pulse:  [45-60] 47  Resp:  [16-18] 18  SpO2:  [88 %-97 %] 97 %  BP: (149-230)/(61-91) 156/66     Weight: 59.4 kg (130 lb 15.3 oz)  Body mass index is 21.79 kg/m².  No intake or output data in the 24 hours ending 02/08/20 1326   Physical Exam   Constitutional: She is oriented to person, place, and time. She appears well-developed  and well-nourished.   HENT:   Head: Normocephalic and atraumatic.   Eyes: Pupils are equal, round, and reactive to light. Conjunctivae and EOM are normal.   Neck: Normal range of motion. Neck supple.   Cardiovascular: Normal rate, regular rhythm, normal heart sounds and intact distal pulses.   Pulmonary/Chest: Effort normal and breath sounds normal.   Abdominal: Soft. Bowel sounds are normal.   Musculoskeletal: Normal range of motion.   Neurological: She is alert and oriented to person, place, and time. She has normal reflexes.   Patient is alert and oriented to person, place and time. Pupils ERRL and EOM normal.  Strength is full bilaterally; it is equal and 5/5 in bilateral upper and lower extremities. There is no pronator drift of outstretched arms. Light touch sense is intact. Mild left side facial droop. Speech is slurred slightly.    Skin: Skin is warm and dry.   Psychiatric: She has a normal mood and affect. Her behavior is normal. Judgment and thought content normal.   Nursing note and vitals reviewed.    Significant Labs: All pertinent labs within the past 24 hours have been reviewed.  Results for orders placed or performed during the hospital encounter of 02/07/20   Hepatitis C antibody   Result Value Ref Range    Hepatitis C Ab Negative Negative   CBC W/ AUTO DIFFERENTIAL   Result Value Ref Range    WBC 9.35 3.90 - 12.70 K/uL    RBC 4.12 4.00 - 5.40 M/uL    Hemoglobin 12.0 12.0 - 16.0 g/dL    Hematocrit 36.7 (L) 37.0 - 48.5 %    Mean Corpuscular Volume 89 82 - 98 fL    Mean Corpuscular Hemoglobin 29.1 27.0 - 31.0 pg    Mean Corpuscular Hemoglobin Conc 32.7 32.0 - 36.0 g/dL    RDW 13.4 11.5 - 14.5 %    Platelets 210 150 - 350 K/uL    MPV 10.8 9.2 - 12.9 fL    Immature Granulocytes 0.2 0.0 - 0.5 %    Gran # (ANC) 5.0 1.8 - 7.7 K/uL    Immature Grans (Abs) 0.02 0.00 - 0.04 K/uL    Lymph # 2.9 1.0 - 4.8 K/uL    Mono # 0.8 0.3 - 1.0 K/uL    Eos # 0.6 (H) 0.0 - 0.5 K/uL    Baso # 0.14 0.00 - 0.20 K/uL    nRBC 0  0 /100 WBC    Gran% 53.2 38.0 - 73.0 %    Lymph% 31.2 18.0 - 48.0 %    Mono% 8.0 4.0 - 15.0 %    Eosinophil% 5.9 0.0 - 8.0 %    Basophil% 1.5 0.0 - 1.9 %    Differential Method Automated    Comprehensive metabolic panel   Result Value Ref Range    Sodium 137 136 - 145 mmol/L    Potassium 4.1 3.5 - 5.1 mmol/L    Chloride 100 95 - 110 mmol/L    CO2 26 23 - 29 mmol/L    Glucose 164 (H) 70 - 110 mg/dL    BUN, Bld 42 (H) 8 - 23 mg/dL    Creatinine 2.6 (H) 0.5 - 1.4 mg/dL    Calcium 9.0 8.7 - 10.5 mg/dL    Total Protein 6.8 6.0 - 8.4 g/dL    Albumin 3.7 3.5 - 5.2 g/dL    Total Bilirubin 0.3 0.1 - 1.0 mg/dL    Alkaline Phosphatase 71 55 - 135 U/L    AST 17 10 - 40 U/L    ALT 8 (L) 10 - 44 U/L    Anion Gap 11 8 - 16 mmol/L    eGFR if African American 21.4 (A) >60 mL/min/1.73 m^2    eGFR if non African American 18.5 (A) >60 mL/min/1.73 m^2   Protime-INR   Result Value Ref Range    Prothrombin Time 11.1 9.0 - 12.5 sec    INR 1.1 0.8 - 1.2   TSH   Result Value Ref Range    TSH 3.461 0.400 - 4.000 uIU/mL   CBC auto differential   Result Value Ref Range    WBC 7.45 3.90 - 12.70 K/uL    RBC 3.83 (L) 4.00 - 5.40 M/uL    Hemoglobin 11.1 (L) 12.0 - 16.0 g/dL    Hematocrit 34.5 (L) 37.0 - 48.5 %    Mean Corpuscular Volume 90 82 - 98 fL    Mean Corpuscular Hemoglobin 29.0 27.0 - 31.0 pg    Mean Corpuscular Hemoglobin Conc 32.2 32.0 - 36.0 g/dL    RDW 13.5 11.5 - 14.5 %    Platelets 191 150 - 350 K/uL    MPV 10.7 9.2 - 12.9 fL    Immature Granulocytes 0.3 0.0 - 0.5 %    Gran # (ANC) 3.9 1.8 - 7.7 K/uL    Immature Grans (Abs) 0.02 0.00 - 0.04 K/uL    Lymph # 2.4 1.0 - 4.8 K/uL    Mono # 0.6 0.3 - 1.0 K/uL    Eos # 0.4 0.0 - 0.5 K/uL    Baso # 0.10 0.00 - 0.20 K/uL    nRBC 0 0 /100 WBC    Gran% 52.3 38.0 - 73.0 %    Lymph% 32.5 18.0 - 48.0 %    Mono% 8.2 4.0 - 15.0 %    Eosinophil% 5.4 0.0 - 8.0 %    Basophil% 1.3 0.0 - 1.9 %    Differential Method Automated    Basic metabolic panel   Result Value Ref Range    Sodium 140 136 - 145 mmol/L     Potassium 3.6 3.5 - 5.1 mmol/L    Chloride 102 95 - 110 mmol/L    CO2 29 23 - 29 mmol/L    Glucose 97 70 - 110 mg/dL    BUN, Bld 41 (H) 8 - 23 mg/dL    Creatinine 2.5 (H) 0.5 - 1.4 mg/dL    Calcium 9.0 8.7 - 10.5 mg/dL    Anion Gap 9 8 - 16 mmol/L    eGFR if African American 22 (A) >60 mL/min/1.73 m^2    eGFR if non African American 19 (A) >60 mL/min/1.73 m^2   CK-MB   Result Value Ref Range    CPK 53 20 - 180 U/L    CPK MB 1.2 0.1 - 6.5 ng/mL    MB% 2.3 0.0 - 5.0 %   Troponin I   Result Value Ref Range    Troponin I 0.016 0.000 - 0.026 ng/mL   Troponin I   Result Value Ref Range    Troponin I 0.015 0.000 - 0.026 ng/mL   Hemoglobin A1c   Result Value Ref Range    Hemoglobin A1C 5.4 4.0 - 5.6 %    Estimated Avg Glucose 108 68 - 131 mg/dL   Troponin I   Result Value Ref Range    Troponin I 0.009 0.000 - 0.026 ng/mL   2D echo with color flow doppler   Result Value Ref Range    QEF 60 55 - 65    Mitral Valve Regurgitation MILD     Diastolic Dysfunction Yes (A)     Est. PA Systolic Pressure 31.52    POCT glucose   Result Value Ref Range    POCT Glucose 134 (H) 70 - 110 mg/dL       Significant Imaging: I have reviewed all pertinent imaging results/findings within the past 24 hours.   Imaging Results          X-Ray Chest AP Portable (Final result)  Result time 02/07/20 23:05:38    Final result by Wilton Gonsalez MD (02/07/20 23:05:38)                 Impression:      No acute findings.      Electronically signed by: Wilton Gonslaez  Date:    02/07/2020  Time:    23:05             Narrative:    EXAMINATION:  XR CHEST AP PORTABLE    CLINICAL HISTORY:  Stroke;    COMPARISON:  09/25/2018    FINDINGS:  Lung fields are clear.  The heart is borderline in size.  The aorta is atherosclerotic.    No pleural fluid or pneumothorax.    No adenopathy is identified.    No significant osseous abnormality.                               CT Head Without Contrast (Final result)  Result time 02/07/20 22:41:36    Final result by Wilton Gonsalez MD  (02/07/20 22:41:36)                 Impression:      No acute findings.    All CT scans at this facility are performed  using dose modulation techniques as appropriate to performed exam including the following:  automated exposure control; adjustment of mA and/or kV according to the patients size (this includes techniques or standardized protocols for targeted exams where dose is matched to indication/reason for exam: i.e. extremities or head);  iterative reconstruction technique.      Electronically signed by: Wilton Gonsalez  Date:    02/07/2020  Time:    22:41             Narrative:    EXAMINATION:  CT HEAD WITHOUT CONTRAST    CLINICAL HISTORY:  Speech changes (or aphasia), new or progressive;    FINDINGS:  No intracranial hemorrhage or acute findings are demonstrated. The visualized paranasal sinuses are clear. The calvarium is intact.

## 2020-02-08 NOTE — H&P
[facility] Hospital Medicine  History & Physical    Patient Name: Sheryl Falcon  MRN: [id number]  Admission Date: 2/7/2020  Attending Physician: Onur Martinez MD   Primary Care Provider: Delmar Roy MD         Patient information was obtained from patient and ER records.     Subjective:     Principal Problem:Facial droop    Chief Complaint:   Chief Complaint   Patient presents with    Facial Droop     Left sided facial droop accompanied with dysphasia. started at 1730.         HPI: Sheryl Falcon is a 63 y/o female with PMH of CKD, COPD, CHF, HTN who presents to the emergency department with complaints of speech difficulty which started around 5:30 p.m. Associated symptoms include left side face droop, left arm and leg numbness, and trouble expressing herself. Symptoms have since improved. Vascular Neurology consulted, pt out of the window for TPA, recommended admission, MRI/MRA brain,TTE, lipid profile, hemoglobin A1c. Add Plavix to ASA x 21 days if MRI confirms stroke. Neurology consult in am.  In the ED, blood pressure elevated at 190 systolic, BUN 42, creatinine 2.6, glucose 164, CT head negative for acute findings. Patient admitted to inpatient for CVA r/o under the care of \[facility]\"" medicine.     Past Medical History:   Diagnosis Date    Cervical cancer     Chronic kidney disease (CKD), stage II (mild)     COPD (chronic obstructive pulmonary disease)     GERD (gastroesophageal reflux disease)     Hypertension     Liver disease     Lumbar herniated disc     Renal disorder     Thyroid disease        Past Surgical History:   Procedure Laterality Date    CAROTID ENDARTERECTOMY      CHOLECYSTECTOMY      HYSTERECTOMY      PARATHYROIDECTOMY      TONSILLECTOMY         Review of patient's allergies indicates:  No Known Allergies    No current facility-administered medications on file prior to encounter.      Current Outpatient Medications on File Prior to Encounter   Medication Sig     albuterol sulfate 90 mcg/actuation AePB Inhale 1 puff into the lungs every 4 (four) hours as needed.    alprazolam (XANAX) 1 MG tablet Take 1 mg by mouth 2 (two) times daily.    amlodipine (NORVASC) 10 MG tablet Take 10 mg by mouth once daily.    aspirin (ECOTRIN) 81 MG EC tablet Take 81 mg by mouth once daily.    atorvastatin (LIPITOR) 20 MG tablet Take 20 mg by mouth every evening.     budesonide-formoterol 160-4.5 mcg (SYMBICORT) 160-4.5 mcg/actuation HFAA Inhale 2 puffs into the lungs every 12 (twelve) hours.    cholecalciferol, vitamin D3, (VITAMIN D3 ORAL) Take 5,000 Units by mouth once daily.    cloNIDine (CATAPRES) 0.1 MG tablet Take 0.1 mg by mouth once.    cyclobenzaprine (FLEXERIL) 10 MG tablet Take 1 tablet (10 mg total) by mouth 3 (three) times daily as needed for Muscle spasms.    cyclobenzaprine (FLEXERIL) 5 MG tablet Take 5 mg by mouth 2 (two) times daily.    ergocalciferol (ERGOCALCIFEROL) 50,000 unit Cap Take 1 capsule (50,000 Units total) by mouth every 7 days.    ergocalciferol (ERGOCALCIFEROL) 50,000 unit Cap Take 50,000 Units by mouth every 7 days.    fentaNYL (DURAGESIC) 25 mcg/hr Place 1 patch onto the skin every 72 hours.    fentaNYL (DURAGESIC) 25 mcg/hr Place 1 patch onto the skin every 72 hours.    gabapentin (NEURONTIN) 300 MG capsule Take 300 mg by mouth 3 (three) times daily.    hydrALAZINE (APRESOLINE) 50 MG tablet Take 50 mg by mouth 3 (three) times daily.    hydrochlorothiazide (HYDRODIURIL) 12.5 MG Tab Take 12.5 mg by mouth once daily.    losartan (COZAAR) 100 MG tablet Take 100 mg by mouth once daily.    metoprolol succinate (TOPROL-XL) 100 MG 24 hr tablet Take 100 mg by mouth once daily.    oxycodone-acetaminophen (PERCOCET)  mg per tablet Take 1 tablet by mouth 3 (three) times daily as needed for Pain.    oxycodone-acetaminophen (PERCOCET)  mg per tablet Take 1 tablet by mouth every 8 (eight) hours as needed for Pain.    pantoprazole (PROTONIX)  40 MG tablet Take 40 mg by mouth once daily.    promethazine (PHENERGAN) 25 MG tablet Take 25 mg by mouth.    zolpidem (AMBIEN) 10 mg Tab Take 5 mg by mouth nightly as needed.     Family History     None        Tobacco Use    Smoking status: Current Every Day Smoker     Packs/day: 1.00     Years: 32.00     Pack years: 32.00     Types: Cigarettes    Smokeless tobacco: Never Used   Substance and Sexual Activity    Alcohol use: No    Drug use: No    Sexual activity: Not on file     Review of Systems   Constitutional: Negative.  Negative for activity change, appetite change, fatigue and fever.   HENT: Negative.    Eyes: Negative.    Respiratory: Negative.  Negative for shortness of breath.    Cardiovascular: Negative.  Negative for chest pain, palpitations and leg swelling.   Gastrointestinal: Negative.    Endocrine: Negative.    Genitourinary: Negative.  Negative for dysuria.   Musculoskeletal: Positive for back pain (chronic ).   Skin: Negative.    Allergic/Immunologic: Negative.    Neurological: Positive for facial asymmetry, speech difficulty and numbness. Negative for dizziness, syncope, weakness and headaches.   Hematological: Negative.    Psychiatric/Behavioral: Negative.      Objective:     Vital Signs (Most Recent):  Temp: 98.7 °F (37.1 °C) (02/08/20 0229)  Pulse: (!) 49 (02/08/20 0229)  Resp: 17 (02/08/20 0229)  BP: (!) 180/68 (02/08/20 0229)  SpO2: (!) 92 % (02/08/20 0229) Vital Signs (24h Range):  Temp:  [98.7 °F (37.1 °C)] 98.7 °F (37.1 °C)  Pulse:  [45-56] 49  Resp:  [16-18] 17  SpO2:  [88 %-97 %] 92 %  BP: (152-230)/(64-91) 180/68     Weight: 59.4 kg (130 lb 15.3 oz)  Body mass index is 21.79 kg/m².    Physical Exam   Constitutional: She is oriented to person, place, and time. She appears well-developed and well-nourished.   HENT:   Head: Normocephalic and atraumatic.   Eyes: Pupils are equal, round, and reactive to light. Conjunctivae and EOM are normal.   Neck: Normal range of motion. Neck  supple.   Cardiovascular: Normal rate, regular rhythm, normal heart sounds and intact distal pulses.   Pulmonary/Chest: Effort normal and breath sounds normal.   Abdominal: Soft. Bowel sounds are normal.   Musculoskeletal: Normal range of motion.   Neurological: She is alert and oriented to person, place, and time. She has normal reflexes.   Patient is alert and oriented to person, place and time. Pupils ERRL and EOM normal.  Strength is full bilaterally; it is equal and 5/5 in bilateral upper and lower extremities. There is no pronator drift of outstretched arms. Light touch sense is intact. Speech is clear and normal. Mild left side facial droop.    Skin: Skin is warm and dry.   Psychiatric: She has a normal mood and affect. Her behavior is normal. Judgment and thought content normal.   Nursing note and vitals reviewed.       Significant Labs:   BMP:   Recent Labs   Lab 02/08/20  0306   GLU 97      K 3.6      CO2 29   BUN 41*   CREATININE 2.5*   CALCIUM 9.0     CBC:   Recent Labs   Lab 02/07/20 2241 02/08/20  0306   WBC 9.35 7.45   HGB 12.0 11.1*   HCT 36.7* 34.5*    191     CMP:   Recent Labs   Lab 02/07/20 2241 02/08/20  0306    140   K 4.1 3.6    102   CO2 26 29   * 97   BUN 42* 41*   CREATININE 2.6* 2.5*   CALCIUM 9.0 9.0   PROT 6.8  --    ALBUMIN 3.7  --    BILITOT 0.3  --    ALKPHOS 71  --    AST 17  --    ALT 8*  --    ANIONGAP 11 9   EGFRNONAA 18.5* 19*     Cardiac Markers: No results for input(s): CKMB, MYOGLOBIN, BNP, TROPISTAT in the last 48 hours.  Lipid Panel: No results for input(s): CHOL, HDL, LDLCALC, TRIG, CHOLHDL in the last 48 hours.  Troponin:   Recent Labs   Lab 02/08/20  0306   TROPONINI 0.016     TSH:   Recent Labs   Lab 02/07/20 2241   TSH 3.461     All pertinent labs within the past 24 hours have been reviewed.    Significant Imaging:   Imaging Results          X-Ray Chest AP Portable (Final result)  Result time 02/07/20 23:05:38    Final result by  Wilton Gonsalez MD (02/07/20 23:05:38)                 Impression:      No acute findings.      Electronically signed by: Wilton Gonsalez  Date:    02/07/2020  Time:    23:05             Narrative:    EXAMINATION:  XR CHEST AP PORTABLE    CLINICAL HISTORY:  Stroke;    COMPARISON:  09/25/2018    FINDINGS:  Lung fields are clear.  The heart is borderline in size.  The aorta is atherosclerotic.    No pleural fluid or pneumothorax.    No adenopathy is identified.    No significant osseous abnormality.                               CT Head Without Contrast (Final result)  Result time 02/07/20 22:41:36    Final result by Wilton Gonsalez MD (02/07/20 22:41:36)                 Impression:      No acute findings.    All CT scans at this facility are performed  using dose modulation techniques as appropriate to performed exam including the following:  automated exposure control; adjustment of mA and/or kV according to the patients size (this includes techniques or standardized protocols for targeted exams where dose is matched to indication/reason for exam: i.e. extremities or head);  iterative reconstruction technique.      Electronically signed by: Wilton Gonsalez  Date:    02/07/2020  Time:    22:41             Narrative:    EXAMINATION:  CT HEAD WITHOUT CONTRAST    CLINICAL HISTORY:  Speech changes (or aphasia), new or progressive;    FINDINGS:  No intracranial hemorrhage or acute findings are demonstrated. The visualized paranasal sinuses are clear. The calvarium is intact.                              Assessment/Plan:     * Facial droop  CVA vs TIA   CT head negative for acute findings   Neuro checks q4 hours   MRI Brain without contrast  MRA Brain and neck  lipid panel, Hgb A1C,   ASA  325 mg po daily and statin   Allow Permissive HTN  PT/OT/ST.   Add Plavix to ASA x 21 days if MRI confirms stroke.   Neurology consult in am         Lumbar herniated disc  Chronic back pain  Resume home meds       Chronic kidney disease (CKD), stage  II (mild)  Creatinine stable   Daily BMP       Hypertension  Hold bp meds allow permissive HTN         VTE Risk Mitigation (From admission, onward)         Ordered     IP VTE LOW RISK PATIENT  Once      02/08/20 0156     Place sequential compression device  Until discontinued      02/08/20 0156                   Sondra Miranda NP  Department of Hospital Medicine   [facility]

## 2020-02-08 NOTE — PT/OT/SLP EVAL
Physical Therapy Evaluation    Patient Name:  Sheryl Falcon   MRN:  [id number]    Recommendations:     Discharge Recommendations:  home health PT, outpatient PT(Home health PT vs outpatient based on progress in acute)   Discharge Equipment Recommendations: walker, rolling   Barriers to discharge: None    Assessment:     Sheryl Falocn is a 64 y.o. female admitted with a medical diagnosis of Facial droop.  She presents with the following impairments/functional limitations:  weakness, impaired endurance, impaired self care skills, impaired functional mobilty, gait instability, impaired balance, decreased lower extremity function, decreased upper extremity function, decreased safety awareness, pain, impaired coordination.    Rehab Prognosis: Good; patient would benefit from acute skilled PT services to address these deficits and reach maximum level of function.    Recent Surgery: * No surgery found *      Plan:     During this hospitalization, patient to be seen 5 x/week to address the identified rehab impairments via gait training, therapeutic activities, therapeutic exercises, neuromuscular re-education and progress toward the following goals:    · Plan of Care Expires:  02/15/20    Subjective     Chief Complaint: Weakness, decreased mobility  Patient/Family Comments/goals: To return to PLOF  Pain/Comfort:  · Pain Rating 1: 7/10  · Location - Orientation 1: lower  · Location 1: back  · Pain Addressed 1: Reposition, Pre-medicate for activity, Distraction, Cessation of Activity(patient reported this is chronic LBP due to degenerative disc disease)  · Pain Rating Post-Intervention 1: 7/10    Patients cultural, spiritual, Buddhism conflicts given the current situation: no    Living Environment:  Patient lives home alone in a one-story house with no stairs/steps.   Prior to admission, patients level of function was indep with amb and ADLs.  Patient was very active prior to current medical issue.  Equipment used at home:  none.  DME owned (not currently used): none.  Upon discharge, patient will have assistance from UNKNOWN.    Objective:     Communicated with Epic and nurse Felder prior to session.  Patient found left sidelying with peripheral IV, telemetry  upon PT entry to room.    General Precautions: Standard, fall   Orthopedic Precautions:N/A   Braces: N/A     Exams:  · Cognitive Exam:  Patient is oriented to Person, Place and Situation  · Fine Motor Coordination:    · -       Impaired  See OT eval  · Gross Motor Coordination:  Impaired  · Postural Exam:  Patient presented with the following abnormalities:    · -       Rounded shoulders  · -       Forward head  · RLE ROM: WFL  · RLE Strength: Grossly 4/5  · LLE ROM: WFL  · LLE Strength: Grossly 4-/5    Functional Mobility:  · Bed Mobility:     · Rolling Right: minimum assistance  · Scooting: minimum assistance  · Supine to Sit: minimum assistance  · Transfers:     · Sit to Stand:  minimum assistance with rolling walker  · Bed to Chair: minimum assistance with  rolling walker  using  Stand Pivot  · Gait: Patient amb 30 feet with RW with min assist.  Very narrow TRAVIS, unsteady, verbal cues to stay close to RW.  · Balance: Poor standing balance      Therapeutic Activities and Exercises:   Patient participated in transfer training and gait training with RW.  Patient requested to use the bathroom.  Required min assist for toilet transfer and SBA/set-up for hygiene purposes.  Patient performed hand-washing standing at the sink with min assist.  Encouraged patient to sit up in chair as long as tolerated and to call nurse's station for assistance when ready to return to bed.  Patient and daughter verbalized understanding.    AM-PAC 6 CLICK MOBILITY  Total Score:16     Patient left up in chair with all lines intact, call button in reach, chair alarm on and daughter present.    GOALS:   Multidisciplinary Problems     Physical Therapy Goals        Problem: Physical Therapy Goal    Goal  Priority Disciplines Outcome Goal Variances Interventions   Physical Therapy Goal     PT, PT/OT Ongoing, Progressing     Description:  LTGs to be met within 7 days (2/15/20):  1.  Patient will perform bed mobility with SBA  2.  Patient will perform functional transfers with RW with SBA  3.  Patient will ambulate 150 feet with RW with SBA and no gross LOB  4. Patient will perform BLE therapeutic exercise x 10 reps with emphasis on coordination and motor control                      History:     Past Medical History:   Diagnosis Date    Cervical cancer     Chronic kidney disease (CKD), stage II (mild)     COPD (chronic obstructive pulmonary disease)     GERD (gastroesophageal reflux disease)     Hypertension     Liver disease     Lumbar herniated disc     Renal disorder     Thyroid disease        Past Surgical History:   Procedure Laterality Date    CAROTID ENDARTERECTOMY      CHOLECYSTECTOMY      HYSTERECTOMY      PARATHYROIDECTOMY      TONSILLECTOMY         Time Tracking:     PT Received On: 02/08/20  PT Start Time: 0855     PT Stop Time: 0921  PT Total Time (min): 26 min     Billable Minutes: Evaluation 14 min and Therapeutic Activity 12 min      Randi Redd, PT, DPT  02/08/2020

## 2020-02-08 NOTE — ED PROVIDER NOTES
Encounter Date: 2/7/2020       History     Chief Complaint   Patient presents with    Facial Droop     Left sided facial droop accompanied with dysphasia. started at 1730.      63 y/o F with PMH of CKD, COPD, CHF, HTN here in the emergency department with complaints of speech changes starting at 5:30 p.m..  These have been improving, were sudden onset, and described as her not being able to get her thoughts out words.  Patient says that her left side of her face in her left arm and both legs had sensation loss and mild weakness at that time as well. Patient came to the emergency department for further evaluation and treatment.  Blood glucose was normal with EMS.  Blood pressure elevated at 190 systolic.        Review of patient's allergies indicates:  No Known Allergies  Past Medical History:   Diagnosis Date    Cervical cancer     Chronic kidney disease (CKD), stage II (mild)     COPD (chronic obstructive pulmonary disease)     GERD (gastroesophageal reflux disease)     Hypertension     Liver disease     Lumbar herniated disc     Renal disorder     Thyroid disease      Past Surgical History:   Procedure Laterality Date    CAROTID ENDARTERECTOMY      CHOLECYSTECTOMY      HYSTERECTOMY      PARATHYROIDECTOMY      TONSILLECTOMY       Family History   Problem Relation Age of Onset    COPD Neg Hx      Social History     Tobacco Use    Smoking status: Current Every Day Smoker     Packs/day: 1.00     Years: 32.00     Pack years: 32.00     Types: Cigarettes    Smokeless tobacco: Never Used   Substance Use Topics    Alcohol use: No    Drug use: No     Review of Systems   Constitutional: Negative for diaphoresis and fever.   HENT: Negative for congestion, dental problem and sore throat.    Eyes: Negative for pain and visual disturbance.   Respiratory: Negative for cough and shortness of breath.    Cardiovascular: Negative for chest pain and palpitations.   Gastrointestinal: Negative for abdominal pain,  diarrhea, nausea and vomiting.   Genitourinary: Negative for dysuria and flank pain.   Musculoskeletal: Negative for back pain and neck pain.   Skin: Negative for rash and wound.   Neurological: Positive for speech difficulty, weakness and numbness. Negative for headaches.   Psychiatric/Behavioral: Negative for agitation and confusion.       Physical Exam     Initial Vitals   BP Pulse Resp Temp SpO2   02/07/20 2241 02/07/20 2238 02/07/20 2241 02/07/20 2241 02/07/20 2241   (!) 213/85 (!) 56 16 98.7 °F (37.1 °C) 96 %      MAP       --                Physical Exam    Constitutional: She appears well-developed and well-nourished.   HENT:   Head: Normocephalic and atraumatic.   Eyes: EOM are normal. Pupils are equal, round, and reactive to light.   Neck: Normal range of motion. Neck supple.   Cardiovascular: Normal rate and regular rhythm.   Pulmonary/Chest: Breath sounds normal. No respiratory distress.   Abdominal: She exhibits no distension. There is no tenderness.   Neurological: She is alert and oriented to person, place, and time. She has normal strength. A sensory deficit is present.   Mild left facial droop.  Mild aphasia.  Mild sensation loss in the face.   Skin: Skin is warm and dry.   Psychiatric: She has a normal mood and affect.         ED Course   Procedures  Labs Reviewed   CBC W/ AUTO DIFFERENTIAL - Abnormal; Notable for the following components:       Result Value    Hematocrit 36.7 (*)     Eos # 0.6 (*)     All other components within normal limits   COMPREHENSIVE METABOLIC PANEL - Abnormal; Notable for the following components:    Glucose 164 (*)     BUN, Bld 42 (*)     Creatinine 2.6 (*)     ALT 8 (*)     eGFR if  21.4 (*)     eGFR if non  18.5 (*)     All other components within normal limits   POCT GLUCOSE - Abnormal; Notable for the following components:    POCT Glucose 134 (*)     All other components within normal limits   PROTIME-INR   TSH   HEPATITIS C ANTIBODY    LIPID PANEL   POCT GLUCOSE, HAND-HELD DEVICE     EKG Readings: (Independently Interpreted)   Rate of 56 beats per minute. Sinus bradycardia.  P.r., QRS and QTC are within normal limits.  No STEMI.  Normal axis.       Imaging Results          X-Ray Chest AP Portable (Final result)  Result time 02/07/20 23:05:38    Final result by Wilton Gonsalez MD (02/07/20 23:05:38)                 Impression:      No acute findings.      Electronically signed by: Wilton Gonsalez  Date:    02/07/2020  Time:    23:05             Narrative:    EXAMINATION:  XR CHEST AP PORTABLE    CLINICAL HISTORY:  Stroke;    COMPARISON:  09/25/2018    FINDINGS:  Lung fields are clear.  The heart is borderline in size.  The aorta is atherosclerotic.    No pleural fluid or pneumothorax.    No adenopathy is identified.    No significant osseous abnormality.                               CT Head Without Contrast (Final result)  Result time 02/07/20 22:41:36    Final result by Wilton Gonsalez MD (02/07/20 22:41:36)                 Impression:      No acute findings.    All CT scans at this facility are performed  using dose modulation techniques as appropriate to performed exam including the following:  automated exposure control; adjustment of mA and/or kV according to the patients size (this includes techniques or standardized protocols for targeted exams where dose is matched to indication/reason for exam: i.e. extremities or head);  iterative reconstruction technique.      Electronically signed by: Wilton Gonsalez  Date:    02/07/2020  Time:    22:41             Narrative:    EXAMINATION:  CT HEAD WITHOUT CONTRAST    CLINICAL HISTORY:  Speech changes (or aphasia), new or progressive;    FINDINGS:  No intracranial hemorrhage or acute findings are demonstrated. The visualized paranasal sinuses are clear. The calvarium is intact.                                   NIH Stroke Scale      Interval: Baseline  Time: 10:55 PM  Person Administering Scale: Hermelindo  Livan    Administer stroke scale items in the order listed. Record performance in each category after each subscale exam. Do not go back and change scores. Follow directions provided for each exam technique. Scores should reflect what the patient does, not what the clinician thinks the patient can do. The clinician should record answers while administering the exam and work quickly. Except where indicated, the patient should not be coached (i.e., repeated requests to patient to make a special effort).      1a  Level of consciousness: 0=alert; keenly responsive   1b. LOC questions:  0=Answers both tasks correctly   1c. LOC commands: 0=Answers both tasks correctly   2.  Best Gaze: 0=normal   3.  Visual: 0=No visual loss   4. Facial Palsy: 1=Minor paralysis (flattened nasolabial fold, asymmetric on smiling)   5a.  Motor left arm: 0=No drift, limb holds 90 (or 45) degrees for full 10 seconds   5b.  Motor right arm: 0=No drift, limb holds 90 (or 45) degrees for full 10 seconds   6a. motor left le=No drift, limb holds 90 (or 45) degrees for full 10 seconds   6b  Motor right le=No drift, limb holds 90 (or 45) degrees for full 10 seconds   7. Limb Ataxia: 0=Absent   8.  Sensory: 1=Mild to moderate sensory loss; patient feels pinprick is less sharp or is dull on the affected side; there is a loss of superficial pain with pinprick but patient is aware She is being touched   9. Best Language:  1=Mild to moderate aphasia; some obvious loss of fluency or facility of comprehension without significant limitation on ideas expressed or form of expression.   10. Dysarthria: 0=Normal   11. Extinction and Inattention: 0=No abnormality   12. Distal motor function: 0=Normal    Total:   3                    ED Course as of 2342   Fri 2020   8566 Not a TPA candidate, outside of window, low NIH with no debilitating symptoms, and her symptoms are improving. Discussed with Dr. Ontiveros with tele-stroke. Reccomends MRI  MRA and observation.     [BA]   2340 All historical, clinical, radiographic, and laboratory findings were reviewed with the patient/family in detail along with the indications for transport to the facility in [place] in order to receive MRI/MRA, hospitalist consultation, neurology consultation.  All remaining questions and concerns were addressed at this time and the patient/family communicates understanding and agrees to proceed accordingly.  Similarly, all pertinent details of the encounter were discussed with Dr. Martinez who agrees to receive the patient at [facility] for further care as outlined above.  The patient will be transferred by [facility] ambulance services secondary to a need for ongoing cardiac monitoring en route.  Hermelindo Licona MD  11:40 PM          [BA]      ED Course User Index  [BA] Hermelindo Licona MD                Clinical Impression:       ICD-10-CM ICD-9-CM   1. Facial droop R29.810 781.94   2. Stroke I63.9 434.91   3. Aphasia R47.01 784.3   4. Facial numbness R20.0 782.0         Disposition:   Disposition: Admitted  Condition: Stable                     Hermelindo Licona MD  02/07/20 3006

## 2020-02-08 NOTE — HPI
Sheryl Falcon is a 65 y/o female with PMH of CKD, COPD, CHF, HTN who presents to the emergency department with complaints of speech difficulty which started around 5:30 p.m. Associated symptoms include left side face droop, left arm and leg numbness, and trouble expressing herself. Symptoms have since improved. Vascular Neurology consulted, pt out of the window for TPA, recommended admission, MRI/MRA brain,TTE, lipid profile, hemoglobin A1c. Add Plavix to ASA x 21 days if MRI confirms stroke. Neurology consult in am.  In the ED, blood pressure elevated at 190 systolic, BUN 42, creatinine 2.6, glucose 164, CT head negative for acute findings. Patient admitted to inpatient for CVA r/o under the care of hospital medicine.

## 2020-02-08 NOTE — ASSESSMENT & PLAN NOTE
CVA vs TIA   CT head negative for acute findings   Neuro checks q4 hours   MRI Brain without contrast  MRA Brain and neck  lipid panel, Hgb A1C,   ASA  325 mg po daily and statin   Allow Permissive HTN  PT/OT/ST.   Add Plavix to ASA x 21 days if MRI confirms stroke.   Neurology consult  -Modified Barium Swallow eval: PASSED

## 2020-02-08 NOTE — PROGRESS NOTES
[facility] Medicine  Progress Note    Patient Name: Sheryl Falcon  MRN: [id number]  Patient Class: IP- Inpatient   Admission Date: 2/7/2020  Length of Stay: 0 days  Attending Physician: Hay Viera MD  Primary Care Provider: Delmar Roy MD    Subjective:     Principal Problem:Acute ischemic cerebrovascular accident (CVA) involving middle cerebral artery territory        HPI:  Sheryl Falcon is a 65 y/o female with PMH of CKD, COPD, CHF, HTN who presents to the emergency department with complaints of speech difficulty which started around 5:30 p.m. Associated symptoms include left side face droop, left arm and leg numbness, and trouble expressing herself. Symptoms have since improved. Vascular Neurology consulted, pt out of the window for TPA, recommended admission, MRI/MRA brain,TTE, lipid profile, hemoglobin A1c. Add Plavix to ASA x 21 days if MRI confirms stroke. Neurology consult in am.  In the ED, blood pressure elevated at 190 systolic, BUN 42, creatinine 2.6, glucose 164, CT head negative for acute findings. Patient admitted to inpatient for CVA r/o under the care of hospital medicine.     Overview/Hospital Course:  Difficulty swallowing and speaking. Failed bedside swallow eval by Speech. Modified barium swallow ordered. MRI brain showed Several small acute cortical infarcts. MRA brain and neck negative. Neurology consult pending. ECHO showed EF 60% with diastolic dysfunction, Pul HTN. Further recs to follow. Patient was counseled on smoking cessation for 10 minutes, RX for nicotine patch offered, patient's daughter states unable to quit. Free Smoking cessation classes offered.         Interval History: Failed bedside swallow by ST. Modified barium swallow showed NO aspiration: solid foods and Thin liquids. Consult to Neurology pending.     ECHO 2/8/20   CONCLUSIONS     1 - Biatrial enlargement.     2 - Concentric hypertrophy.     3 - No wall motion abnormalities.     4 - Normal left  ventricular systolic function (EF 60-65%).     5 - Impaired LV relaxation, elevated LAP (grade 2 diastolic dysfunction).     6 - Normal right ventricular systolic function .     7 - The estimated PA systolic pressure is 32 mmHg.     8 - Mild mitral regurgitation.     Review of Systems   Constitutional: Positive for activity change, appetite change and fatigue. Negative for fever.   HENT: Negative.    Eyes: Negative.    Respiratory: Negative.  Negative for shortness of breath.    Cardiovascular: Negative.  Negative for chest pain, palpitations and leg swelling.   Gastrointestinal: Negative.    Endocrine: Negative.    Genitourinary: Negative.  Negative for dysuria.   Musculoskeletal: Positive for back pain (chronic ).   Skin: Negative.    Allergic/Immunologic: Negative.    Neurological: Positive for facial asymmetry, speech difficulty, weakness (left leg, left arm) and numbness. Negative for dizziness, syncope and headaches.   Hematological: Negative.    Psychiatric/Behavioral: Positive for dysphoric mood.     Objective:     Vital Signs (Most Recent):  Temp: 98.5 °F (36.9 °C) (02/08/20 0718)  Pulse: (!) 47 (02/08/20 1100)  Resp: 18 (02/08/20 0817)  BP: (!) 156/66 (02/08/20 0718)  SpO2: 97 % (02/08/20 0817) Vital Signs (24h Range):  Temp:  [98.4 °F (36.9 °C)-98.7 °F (37.1 °C)] 98.5 °F (36.9 °C)  Pulse:  [45-60] 47  Resp:  [16-18] 18  SpO2:  [88 %-97 %] 97 %  BP: (149-230)/(61-91) 156/66     Weight: 59.4 kg (130 lb 15.3 oz)  Body mass index is 21.79 kg/m².  No intake or output data in the 24 hours ending 02/08/20 1326   Physical Exam   Constitutional: She is oriented to person, place, and time. She appears well-developed and well-nourished.   HENT:   Head: Normocephalic and atraumatic.   Eyes: Pupils are equal, round, and reactive to light. Conjunctivae and EOM are normal.   Neck: Normal range of motion. Neck supple.   Cardiovascular: Normal rate, regular rhythm, normal heart sounds and intact distal pulses.    Pulmonary/Chest: Effort normal and breath sounds normal.   Abdominal: Soft. Bowel sounds are normal.   Musculoskeletal: Normal range of motion.   Neurological: She is alert and oriented to person, place, and time. She has normal reflexes.   Patient is alert and oriented to person, place and time. Pupils ERRL and EOM normal.  Strength is full bilaterally; it is equal and 5/5 in bilateral upper and lower extremities. There is no pronator drift of outstretched arms. Light touch sense is intact. Mild left side facial droop. Speech is slurred slightly.    Skin: Skin is warm and dry.   Psychiatric: She has a normal mood and affect. Her behavior is normal. Judgment and thought content normal.   Nursing note and vitals reviewed.    Significant Labs: All pertinent labs within the past 24 hours have been reviewed.  Results for orders placed or performed during the hospital encounter of 02/07/20   Hepatitis C antibody   Result Value Ref Range    Hepatitis C Ab Negative Negative   CBC W/ AUTO DIFFERENTIAL   Result Value Ref Range    WBC 9.35 3.90 - 12.70 K/uL    RBC 4.12 4.00 - 5.40 M/uL    Hemoglobin 12.0 12.0 - 16.0 g/dL    Hematocrit 36.7 (L) 37.0 - 48.5 %    Mean Corpuscular Volume 89 82 - 98 fL    Mean Corpuscular Hemoglobin 29.1 27.0 - 31.0 pg    Mean Corpuscular Hemoglobin Conc 32.7 32.0 - 36.0 g/dL    RDW 13.4 11.5 - 14.5 %    Platelets 210 150 - 350 K/uL    MPV 10.8 9.2 - 12.9 fL    Immature Granulocytes 0.2 0.0 - 0.5 %    Gran # (ANC) 5.0 1.8 - 7.7 K/uL    Immature Grans (Abs) 0.02 0.00 - 0.04 K/uL    Lymph # 2.9 1.0 - 4.8 K/uL    Mono # 0.8 0.3 - 1.0 K/uL    Eos # 0.6 (H) 0.0 - 0.5 K/uL    Baso # 0.14 0.00 - 0.20 K/uL    nRBC 0 0 /100 WBC    Gran% 53.2 38.0 - 73.0 %    Lymph% 31.2 18.0 - 48.0 %    Mono% 8.0 4.0 - 15.0 %    Eosinophil% 5.9 0.0 - 8.0 %    Basophil% 1.5 0.0 - 1.9 %    Differential Method Automated    Comprehensive metabolic panel   Result Value Ref Range    Sodium 137 136 - 145 mmol/L    Potassium 4.1  3.5 - 5.1 mmol/L    Chloride 100 95 - 110 mmol/L    CO2 26 23 - 29 mmol/L    Glucose 164 (H) 70 - 110 mg/dL    BUN, Bld 42 (H) 8 - 23 mg/dL    Creatinine 2.6 (H) 0.5 - 1.4 mg/dL    Calcium 9.0 8.7 - 10.5 mg/dL    Total Protein 6.8 6.0 - 8.4 g/dL    Albumin 3.7 3.5 - 5.2 g/dL    Total Bilirubin 0.3 0.1 - 1.0 mg/dL    Alkaline Phosphatase 71 55 - 135 U/L    AST 17 10 - 40 U/L    ALT 8 (L) 10 - 44 U/L    Anion Gap 11 8 - 16 mmol/L    eGFR if African American 21.4 (A) >60 mL/min/1.73 m^2    eGFR if non African American 18.5 (A) >60 mL/min/1.73 m^2   Protime-INR   Result Value Ref Range    Prothrombin Time 11.1 9.0 - 12.5 sec    INR 1.1 0.8 - 1.2   TSH   Result Value Ref Range    TSH 3.461 0.400 - 4.000 uIU/mL   CBC auto differential   Result Value Ref Range    WBC 7.45 3.90 - 12.70 K/uL    RBC 3.83 (L) 4.00 - 5.40 M/uL    Hemoglobin 11.1 (L) 12.0 - 16.0 g/dL    Hematocrit 34.5 (L) 37.0 - 48.5 %    Mean Corpuscular Volume 90 82 - 98 fL    Mean Corpuscular Hemoglobin 29.0 27.0 - 31.0 pg    Mean Corpuscular Hemoglobin Conc 32.2 32.0 - 36.0 g/dL    RDW 13.5 11.5 - 14.5 %    Platelets 191 150 - 350 K/uL    MPV 10.7 9.2 - 12.9 fL    Immature Granulocytes 0.3 0.0 - 0.5 %    Gran # (ANC) 3.9 1.8 - 7.7 K/uL    Immature Grans (Abs) 0.02 0.00 - 0.04 K/uL    Lymph # 2.4 1.0 - 4.8 K/uL    Mono # 0.6 0.3 - 1.0 K/uL    Eos # 0.4 0.0 - 0.5 K/uL    Baso # 0.10 0.00 - 0.20 K/uL    nRBC 0 0 /100 WBC    Gran% 52.3 38.0 - 73.0 %    Lymph% 32.5 18.0 - 48.0 %    Mono% 8.2 4.0 - 15.0 %    Eosinophil% 5.4 0.0 - 8.0 %    Basophil% 1.3 0.0 - 1.9 %    Differential Method Automated    Basic metabolic panel   Result Value Ref Range    Sodium 140 136 - 145 mmol/L    Potassium 3.6 3.5 - 5.1 mmol/L    Chloride 102 95 - 110 mmol/L    CO2 29 23 - 29 mmol/L    Glucose 97 70 - 110 mg/dL    BUN, Bld 41 (H) 8 - 23 mg/dL    Creatinine 2.5 (H) 0.5 - 1.4 mg/dL    Calcium 9.0 8.7 - 10.5 mg/dL    Anion Gap 9 8 - 16 mmol/L    eGFR if African American 22 (A) >60  mL/min/1.73 m^2    eGFR if non African American 19 (A) >60 mL/min/1.73 m^2   CK-MB   Result Value Ref Range    CPK 53 20 - 180 U/L    CPK MB 1.2 0.1 - 6.5 ng/mL    MB% 2.3 0.0 - 5.0 %   Troponin I   Result Value Ref Range    Troponin I 0.016 0.000 - 0.026 ng/mL   Troponin I   Result Value Ref Range    Troponin I 0.015 0.000 - 0.026 ng/mL   Hemoglobin A1c   Result Value Ref Range    Hemoglobin A1C 5.4 4.0 - 5.6 %    Estimated Avg Glucose 108 68 - 131 mg/dL   Troponin I   Result Value Ref Range    Troponin I 0.009 0.000 - 0.026 ng/mL   2D echo with color flow doppler   Result Value Ref Range    QEF 60 55 - 65    Mitral Valve Regurgitation MILD     Diastolic Dysfunction Yes (A)     Est. PA Systolic Pressure 31.52    POCT glucose   Result Value Ref Range    POCT Glucose 134 (H) 70 - 110 mg/dL       Significant Imaging: I have reviewed all pertinent imaging results/findings within the past 24 hours.   Imaging Results          X-Ray Chest AP Portable (Final result)  Result time 02/07/20 23:05:38    Final result by Wilton Gonsalez MD (02/07/20 23:05:38)                 Impression:      No acute findings.      Electronically signed by: Wilton Gonsalez  Date:    02/07/2020  Time:    23:05             Narrative:    EXAMINATION:  XR CHEST AP PORTABLE    CLINICAL HISTORY:  Stroke;    COMPARISON:  09/25/2018    FINDINGS:  Lung fields are clear.  The heart is borderline in size.  The aorta is atherosclerotic.    No pleural fluid or pneumothorax.    No adenopathy is identified.    No significant osseous abnormality.                               CT Head Without Contrast (Final result)  Result time 02/07/20 22:41:36    Final result by Wilton Gonsalez MD (02/07/20 22:41:36)                 Impression:      No acute findings.    All CT scans at this facility are performed  using dose modulation techniques as appropriate to performed exam including the following:  automated exposure control; adjustment of mA and/or kV according to the patients  "size (this includes techniques or standardized protocols for targeted exams where dose is matched to indication/reason for exam: i.e. extremities or head);  iterative reconstruction technique.      Electronically signed by: Wilton Gonsalez  Date:    02/07/2020  Time:    22:41             Narrative:    EXAMINATION:  CT HEAD WITHOUT CONTRAST    CLINICAL HISTORY:  Speech changes (or aphasia), new or progressive;    FINDINGS:  No intracranial hemorrhage or acute findings are demonstrated. The visualized paranasal sinuses are clear. The calvarium is intact.                                    Assessment/Plan:      * Acute ischemic cerebrovascular accident (CVA) involving middle cerebral artery territory  Acute Middle cerebral artery CVA  -CT head negative for acute findings   Neuro checks q4 hours   -MRI Brain without contrast: Several small acute cortical infarcts   -MRA Brain and neck: No large vessel occlusion  lipid panel pending  - Hgb A1C pending  ASA  325 mg po daily and statin   Allow Permissive HTN  PT/OT/ST.   Add Plavix to ASA x 21 days if MRI confirms stroke.   Neurology consult: Dr. Baron  Failed bedside swallow: PASSED Modified Barium Swallow: solid foods and thin liquids           Facial droop  CVA vs TIA   CT head negative for acute findings   Neuro checks q4 hours   MRI Brain without contrast  MRA Brain and neck  lipid panel, Hgb A1C,   ASA  325 mg po daily and statin   Allow Permissive HTN  PT/OT/ST.   Add Plavix to ASA x 21 days if MRI confirms stroke.   Neurology consult  -Modified Barium Swallow eval: PASSED         Tobacco abuse  Tobacco abuse  -smoking cessation counseling  -nicotine patch  -"Patient was counseled on smoking cessation for 10 minutes, RX for nicotine patch offered, patient's daughter states unable to quit".          Lumbar herniated disc  Chronic back pain  Resume home meds       Stage 4 chronic kidney disease  Creatinine stable   Daily BMP   -outpatient Nephrology     Hypertension  Hold " bp meds allow permissive HTN           VTE Risk Mitigation (From admission, onward)         Ordered     IP VTE LOW RISK PATIENT  Once      02/08/20 0811     Place sequential compression device  Until discontinued      02/08/20 0811     Place sequential compression device  Until discontinued      02/08/20 0156                      Nova Whitney NP  Department of Hospital Medicine   [facility]

## 2020-02-08 NOTE — PLAN OF CARE
PATIENT WOULD BENEFIT FROM SKILLED OT INTERVENTION WITH FOCUS ON NEURO RE-ED TO INCREASE FUNC USE OF LUE AND TO INCREASE SAFETY AND (I) WITH SELF CARE TASKS.

## 2020-02-09 VITALS
DIASTOLIC BLOOD PRESSURE: 76 MMHG | TEMPERATURE: 97 F | BODY MASS INDEX: 21.64 KG/M2 | RESPIRATION RATE: 18 BRPM | WEIGHT: 129.88 LBS | OXYGEN SATURATION: 97 % | SYSTOLIC BLOOD PRESSURE: 177 MMHG | HEIGHT: 65 IN | HEART RATE: 45 BPM

## 2020-02-09 PROBLEM — R00.1 BRADYCARDIA: Status: ACTIVE | Noted: 2020-02-09

## 2020-02-09 LAB
ANION GAP SERPL CALC-SCNC: 8 MMOL/L (ref 8–16)
BASOPHILS # BLD AUTO: 0.1 K/UL (ref 0–0.2)
BASOPHILS NFR BLD: 1.5 % (ref 0–1.9)
BUN SERPL-MCNC: 42 MG/DL (ref 8–23)
CALCIUM SERPL-MCNC: 8.8 MG/DL (ref 8.7–10.5)
CHLORIDE SERPL-SCNC: 105 MMOL/L (ref 95–110)
CO2 SERPL-SCNC: 26 MMOL/L (ref 23–29)
CREAT SERPL-MCNC: 2.8 MG/DL (ref 0.5–1.4)
DIFFERENTIAL METHOD: ABNORMAL
EOSINOPHIL # BLD AUTO: 0.3 K/UL (ref 0–0.5)
EOSINOPHIL NFR BLD: 4.7 % (ref 0–8)
ERYTHROCYTE [DISTWIDTH] IN BLOOD BY AUTOMATED COUNT: 13.3 % (ref 11.5–14.5)
EST. GFR  (AFRICAN AMERICAN): 20 ML/MIN/1.73 M^2
EST. GFR  (NON AFRICAN AMERICAN): 17 ML/MIN/1.73 M^2
GLUCOSE SERPL-MCNC: 91 MG/DL (ref 70–110)
HCT VFR BLD AUTO: 35.8 % (ref 37–48.5)
HGB BLD-MCNC: 11.2 G/DL (ref 12–16)
IMM GRANULOCYTES # BLD AUTO: 0.02 K/UL (ref 0–0.04)
IMM GRANULOCYTES NFR BLD AUTO: 0.3 % (ref 0–0.5)
LYMPHOCYTES # BLD AUTO: 2.5 K/UL (ref 1–4.8)
LYMPHOCYTES NFR BLD: 36.5 % (ref 18–48)
MAGNESIUM SERPL-MCNC: 2.4 MG/DL (ref 1.6–2.6)
MCH RBC QN AUTO: 28.5 PG (ref 27–31)
MCHC RBC AUTO-ENTMCNC: 31.3 G/DL (ref 32–36)
MCV RBC AUTO: 91 FL (ref 82–98)
MONOCYTES # BLD AUTO: 0.6 K/UL (ref 0.3–1)
MONOCYTES NFR BLD: 8.9 % (ref 4–15)
NEUTROPHILS # BLD AUTO: 3.3 K/UL (ref 1.8–7.7)
NEUTROPHILS NFR BLD: 48.1 % (ref 38–73)
NRBC BLD-RTO: 0 /100 WBC
PHOSPHATE SERPL-MCNC: 4.2 MG/DL (ref 2.7–4.5)
PLATELET # BLD AUTO: 174 K/UL (ref 150–350)
PMV BLD AUTO: 10.5 FL (ref 9.2–12.9)
POTASSIUM SERPL-SCNC: 4 MMOL/L (ref 3.5–5.1)
RBC # BLD AUTO: 3.93 M/UL (ref 4–5.4)
SODIUM SERPL-SCNC: 139 MMOL/L (ref 136–145)
WBC # BLD AUTO: 6.88 K/UL (ref 3.9–12.7)

## 2020-02-09 PROCEDURE — 25000242 PHARM REV CODE 250 ALT 637 W/ HCPCS: Performed by: INTERNAL MEDICINE

## 2020-02-09 PROCEDURE — 85025 COMPLETE CBC W/AUTO DIFF WBC: CPT

## 2020-02-09 PROCEDURE — 97110 THERAPEUTIC EXERCISES: CPT | Mod: CQ

## 2020-02-09 PROCEDURE — 83735 ASSAY OF MAGNESIUM: CPT

## 2020-02-09 PROCEDURE — 80048 BASIC METABOLIC PNL TOTAL CA: CPT

## 2020-02-09 PROCEDURE — 25000003 PHARM REV CODE 250: Performed by: NURSE PRACTITIONER

## 2020-02-09 PROCEDURE — 84100 ASSAY OF PHOSPHORUS: CPT

## 2020-02-09 PROCEDURE — 97116 GAIT TRAINING THERAPY: CPT | Mod: CQ

## 2020-02-09 RX ORDER — AMLODIPINE BESYLATE 10 MG/1
10 TABLET ORAL DAILY
Qty: 30 TABLET | Refills: 1 | Status: SHIPPED | OUTPATIENT
Start: 2020-02-09

## 2020-02-09 RX ORDER — BUDESONIDE AND FORMOTEROL FUMARATE DIHYDRATE 160; 4.5 UG/1; UG/1
2 AEROSOL RESPIRATORY (INHALATION) EVERY 12 HOURS
Qty: 10.2 G | Refills: 0 | Status: SHIPPED | OUTPATIENT
Start: 2020-02-09 | End: 2021-02-08

## 2020-02-09 RX ORDER — ASPIRIN 81 MG/1
81 TABLET ORAL DAILY
Qty: 200 TABLET | Refills: 0 | Status: SHIPPED | OUTPATIENT
Start: 2020-02-10 | End: 2021-02-09

## 2020-02-09 RX ORDER — PANTOPRAZOLE SODIUM 40 MG/1
40 TABLET, DELAYED RELEASE ORAL DAILY
Qty: 30 TABLET | Refills: 1 | Status: SHIPPED | OUTPATIENT
Start: 2020-02-10 | End: 2021-02-09

## 2020-02-09 RX ORDER — HYDRALAZINE HYDROCHLORIDE 50 MG/1
50 TABLET, FILM COATED ORAL EVERY 8 HOURS
Qty: 90 TABLET | Refills: 1 | Status: SHIPPED | OUTPATIENT
Start: 2020-02-09 | End: 2021-02-08

## 2020-02-09 RX ORDER — ATORVASTATIN CALCIUM 40 MG/1
40 TABLET, FILM COATED ORAL DAILY
Qty: 30 TABLET | Refills: 1 | Status: SHIPPED | OUTPATIENT
Start: 2020-02-10 | End: 2021-02-09

## 2020-02-09 RX ORDER — CLOPIDOGREL BISULFATE 75 MG/1
75 TABLET ORAL DAILY
Qty: 21 TABLET | Refills: 0 | Status: SHIPPED | OUTPATIENT
Start: 2020-02-10 | End: 2020-03-02

## 2020-02-09 RX ADMIN — ATORVASTATIN CALCIUM 40 MG: 40 TABLET, FILM COATED ORAL at 08:02

## 2020-02-09 RX ADMIN — ASPIRIN 81 MG: 81 TABLET, COATED ORAL at 08:02

## 2020-02-09 RX ADMIN — ARFORMOTEROL TARTRATE 15 MCG: 15 SOLUTION RESPIRATORY (INHALATION) at 07:02

## 2020-02-09 RX ADMIN — CLOPIDOGREL BISULFATE 75 MG: 75 TABLET ORAL at 08:02

## 2020-02-09 RX ADMIN — ALPRAZOLAM 1 MG: 1 TABLET ORAL at 08:02

## 2020-02-09 RX ADMIN — PANTOPRAZOLE SODIUM 40 MG: 40 TABLET, DELAYED RELEASE ORAL at 08:02

## 2020-02-09 RX ADMIN — OXYCODONE HYDROCHLORIDE AND ACETAMINOPHEN 1 TABLET: 10; 325 TABLET ORAL at 02:02

## 2020-02-09 RX ADMIN — BUDESONIDE 0.5 MG: 0.5 SUSPENSION RESPIRATORY (INHALATION) at 07:02

## 2020-02-09 RX ADMIN — OXYCODONE HYDROCHLORIDE AND ACETAMINOPHEN 1 TABLET: 10; 325 TABLET ORAL at 06:02

## 2020-02-09 NOTE — PLAN OF CARE
Sw met with patient at the beside. SW explained role/purpose of visit. Patient stated her family has not made a decision HH agency. Kyra explained to client that CM will follow up with her later if she is d/c.

## 2020-02-09 NOTE — CONSULTS
Subjective:       Patient ID: Sheryl Falcon is a 64 y.o. female.    Chief Complaint: Facial Droop (Left sided facial droop accompanied with dysphasia. started at 1730. )      HPI     HPI:      The patient was admitted through the ED on 02- with LT side weakness/numbness and speech slurring with 's/80's. CTH was unremarkable and EKG NSR.  The patient outside the window for IV t-PA. The patient was started on DAPT with [place] and admitted to the hospital. The deficits did improve since admission. Brain MRI showed multifocal RT MCA infarcts. MRA H/N was unremarkable. TTE showed NL EF with CLVH and CRISTINA.  HA1C 5.4 and .     Medical comorbidities are most remarkable for Uncontrolled HTN, Smoking, Systemic Vasculopathy (CVD S/P CEA, AAA,CAD, CHF, PVD S/P Bypass), CKD. COPD.         Review of Systems   Constitutional: Positive for fatigue. Negative for appetite change.   HENT: Negative for hearing loss and tinnitus.    Eyes: Negative for photophobia and visual disturbance.   Respiratory: Negative for apnea and shortness of breath.    Cardiovascular: Negative for chest pain and palpitations.   Gastrointestinal: Negative for nausea and vomiting.   Endocrine: Negative for cold intolerance and heat intolerance.   Genitourinary: Negative for difficulty urinating and urgency.   Musculoskeletal: Positive for arthralgias. Negative for back pain, gait problem, joint swelling, myalgias, neck pain and neck stiffness.   Skin: Negative for color change and rash.   Allergic/Immunologic: Negative for environmental allergies and immunocompromised state.   Neurological: Positive for speech difficulty and weakness. Negative for dizziness, tremors, seizures, syncope, facial asymmetry, light-headedness, numbness and headaches.   Hematological: Negative for adenopathy. Does not bruise/bleed easily.   Psychiatric/Behavioral: Negative for agitation, behavioral problems, confusion, decreased concentration, dysphoric mood,  hallucinations, self-injury, sleep disturbance and suicidal ideas. The patient is not hyperactive.                  Current Facility-Administered Medications:     albuterol-ipratropium 2.5 mg-0.5 mg/3 mL nebulizer solution 3 mL, 3 mL, Nebulization, Q4H PRN, Sondra Miranda NP    ALPRAZolam tablet 1 mg, 1 mg, Oral, BID, Sondra Miranda NP, 1 mg at 02/08/20 0922    arformoteroL nebulizer solution 15 mcg, 15 mcg, Nebulization, BID, 15 mcg at 02/08/20 0817 **AND** Inhalation Treatment BID, , , BID **AND** budesonide nebulizer solution 0.5 mg, 0.5 mg, Nebulization, Q12H, Onur Martinez MD, 0.5 mg at 02/08/20 0813    [START ON 2/9/2020] aspirin EC tablet 81 mg, 81 mg, Oral, Daily, Nova Whitney NP    atorvastatin tablet 40 mg, 40 mg, Oral, Daily, Sondra Miranda NP, 40 mg at 02/08/20 0922    clopidogreL tablet 75 mg, 75 mg, Oral, Daily, Nova Whitney NP, 75 mg at 02/08/20 1523    hydrALAZINE injection 10 mg, 10 mg, Intravenous, Q8H PRN, Sondra Miranda NP, 10 mg at 02/08/20 0403    oxyCODONE-acetaminophen  mg per tablet 1 tablet, 1 tablet, Oral, Q8H PRN, Sondra Miranda NP, 1 tablet at 02/08/20 1308    pantoprazole EC tablet 40 mg, 40 mg, Oral, Daily, Sondra Miranda NP, 40 mg at 02/08/20 0922    sodium chloride 0.9% flush 10 mL, 10 mL, Intravenous, PRN, Hermelindo Licona MD    sodium chloride 0.9% flush 10 mL, 10 mL, Intravenous, PRN, Sondra Miranda NP  Past Medical History:   Diagnosis Date    Cervical cancer     Chronic kidney disease (CKD), stage II (mild)     COPD (chronic obstructive pulmonary disease)     GERD (gastroesophageal reflux disease)     Hypertension     Liver disease     Lumbar herniated disc     PAD (peripheral artery disease)     Renal disorder     Thyroid disease      Past Surgical History:   Procedure Laterality Date    CAROTID ENDARTERECTOMY      CHOLECYSTECTOMY      HYSTERECTOMY      PARATHYROIDECTOMY      peripheral surgery       TONSILLECTOMY       Social History     Socioeconomic History    Marital status:      Spouse name: Not on file    Number of children: Not on file    Years of education: Not on file    Highest education level: Not on file   Occupational History    Not on file   Social Needs    Financial resource strain: Not on file    Food insecurity:     Worry: Not on file     Inability: Not on file    Transportation needs:     Medical: Not on file     Non-medical: Not on file   Tobacco Use    Smoking status: Current Every Day Smoker     Packs/day: 1.00     Years: 42.00     Pack years: 42.00     Types: Cigarettes    Smokeless tobacco: Never Used   Substance and Sexual Activity    Alcohol use: No    Drug use: No    Sexual activity: Not on file   Lifestyle    Physical activity:     Days per week: Not on file     Minutes per session: Not on file    Stress: Not on file   Relationships    Social connections:     Talks on phone: Not on file     Gets together: Not on file     Attends [employer] service: Not on file     Active member of club or organization: Not on file     Attends meetings of clubs or organizations: Not on file     Relationship status: Not on file   Other Topics Concern    Not on file   Social History Narrative    Granddaughter lives with her. SMD: DaughterLuda [phone]                  Past/Current Medical/Surgical History, Past/Current Social History, Past/Current Family History and Past/Current Medications were reviewed in detail.        Objective:           VITAL SIGNS WERE REVIEWED      GENERAL APPEARANCE:     The patient looks comfortable.    Body habitus is thin.    No signs of respiratory distress.    Normal breathing pattern.    No dysmorphic features    Normal eye contact.     GENERAL MEDICAL EXAM:    HEENT:  Head is atraumatic normocephalic. S/P CEA.     No tender temporal arteries. Fundoscopic (Ophthalmoscopic) exam showed no disc edema.      Neck and Axillae: No JVD. No  visible lesions.    No carotid bruits. No thyromegaly. No lymphadenopathy.    Cardiopulmonary: No cyanosis. No tachypnea. Normal respiratory effort.    Coarse breath sounds. S1, S2 with regular rhythm . No murmurs.     Gastrointestinal/Urogenital:  No jaundice. No stomas or lesions. No visible hernias. No catheters.     Abdomen is soft non-tender. No masses or organomegaly. AAA    Skin, Hair and Nails: No pathognonomic skin rash. No neurofibromatosis. No visible lesions.No stigmata of autoimmune disease. No clubbing.    Skin is warm and moist. No palpable masses.    Limbs: No varicose veins. No visible swelling. S/P Bypass.     No palpable edema. Pulses are symmetric. Pedal pulses are palpable.      Muskoskeletal: No visible deformities.No visible lesions.    No spine tenderness. No signs of longstanding neuropathy. No dislocations or fractures.            Neurologic Exam     Mental Status   Oriented to person, place, and time.   Registration: recalls 3 of 3 objects. Recall at 5 minutes: recalls 3 of 3 objects. Follows 3 step commands.   Attention: normal. Concentration: normal.   Speech: speech is normal   Level of consciousness: alert  Knowledge: good and consistent with education. Able to perform simple calculations.   Able to name object. Able to read. Able to repeat. Able to write. Normal comprehension.     Cranial Nerves     CN II   Visual fields full to confrontation.   Visual acuity: normal  Right visual field deficit: none  Left visual field deficit: none     CN III, IV, VI   Pupils are equal, round, and reactive to light.  Extraocular motions are normal.   Right pupil: Size: 2 mm. Shape: regular. Reactivity: brisk. Consensual response: intact. Accommodation: intact.   Left pupil: Size: 2 mm. Shape: regular. Reactivity: brisk. Consensual response: intact. Accommodation: intact.   CN III: no CN III palsy  CN VI: no CN VI palsy  Nystagmus: none   Diplopia: none  Ophthalmoparesis: none  Upgaze:  normal  Downgaze: normal  Conjugate gaze: present  Vestibulo-ocular reflex: present    CN V   Facial sensation intact.   Right facial sensation deficit: none  Left facial sensation deficit: none  Right corneal reflex: normal  Left corneal reflex: normal    CN VII   Right facial weakness: none  Left facial weakness: central  Right taste: normal  Left taste: normal    CN VIII   CN VIII normal.   Hearing: intact  Right Rinne: AC > BC  Left Rinne: AC > BC  [place]: does not lateralize     CN IX, X   CN IX normal.   CN X normal.   Palate: symmetric  Right gag reflex: normal  Left gag reflex: normal    CN XI   CN XI normal.   Right sternocleidomastoid strength: normal  Left sternocleidomastoid strength: normal  Right trapezius strength: normal  Left trapezius strength: normal    CN XII   CN XII normal.   Tongue: not atrophic  Fasciculations: absent  Tongue deviation: none    Motor Exam   Muscle bulk: normal  Overall muscle tone: normal  Right arm tone: normal  Left arm tone: normal  Right arm pronator drift: absent  Left arm pronator drift: present  Right leg tone: normal  Left leg tone: normal    Strength   Strength 5/5 throughout.   Right neck flexion: 5/5  Left neck flexion: 5/5  Right neck extension: 5/5  Left neck extension: 5/5  Right deltoid: 5/5  Left deltoid: 4/5  Right biceps: 5/5  Left biceps: 4/5  Right triceps: 5/5  Left triceps: 4/5  Right wrist flexion: 5/5  Left wrist flexion: 4/5  Right wrist extension: 5/5  Left wrist extension: 4/5  Right interossei: 5/5  Left interossei: 4/5  Right iliopsoas: 5/5  Left iliopsoas: 5/5  Right quadriceps: 5/5  Left quadriceps: 5/5  Right hamstrin/5  Left hamstrin/5  Right glutei: 5/5  Left glutei: 5/5  Right anterior tibial: 5/5  Left anterior tibial: 5/5  Right posterior tibial: 5/5  Left posterior tibial: 5/5  Right peroneal: 5/5  Left peroneal: 5/5  Right gastroc: 5/5  Left gastroc: 5/5    Sensory Exam   Light touch normal.   Right arm light touch: normal  Left  arm light touch: normal  Right leg light touch: normal  Left leg light touch: normal  Vibration normal.   Right arm vibration: normal  Left arm vibration: normal  Right leg vibration: normal  Left leg vibration: normal  Proprioception normal.   Right arm proprioception: normal  Left arm proprioception: normal  Right leg proprioception: normal  Left leg proprioception: normal  Pinprick normal.   Right arm pinprick: normal  Left arm pinprick: normal  Right leg pinprick: normal  Left leg pinprick: normal  Graphesthesia: normal  Stereognosis: normal    Gait, Coordination, and Reflexes     Gait  Gait: normal    Coordination   Finger to nose coordination: normal  Heel to shin coordination: normal    Tremor   Resting tremor: absent  Intention tremor: absent  Action tremor: absent    Reflexes   Right brachioradialis: 2+  Left brachioradialis: 2+  Right biceps: 2+  Left biceps: 2+  Right triceps: 2+  Left triceps: 2+  Right patellar: 2+  Left patellar: 2+  Right achilles: 2+  Left achilles: 2+  Right plantar: normal  Left plantar: upgoing  Right Lopez: absent  Left Lopez: absent  Right ankle clonus: absent  Left ankle clonus: absent  Right pendular knee jerk: absent  Left pendular knee jerk: absent      Lab Results   Component Value Date    WBC 7.45 02/08/2020    HGB 11.1 (L) 02/08/2020    HCT 34.5 (L) 02/08/2020    MCV 90 02/08/2020     02/08/2020     Sodium   Date Value Ref Range Status   02/08/2020 140 136 - 145 mmol/L Final     Potassium   Date Value Ref Range Status   02/08/2020 3.6 3.5 - 5.1 mmol/L Final     Chloride   Date Value Ref Range Status   02/08/2020 102 95 - 110 mmol/L Final     CO2   Date Value Ref Range Status   02/08/2020 29 23 - 29 mmol/L Final     Glucose   Date Value Ref Range Status   02/08/2020 97 70 - 110 mg/dL Final     BUN, Bld   Date Value Ref Range Status   02/08/2020 41 (H) 8 - 23 mg/dL Final     Creatinine   Date Value Ref Range Status   02/08/2020 2.5 (H) 0.5 - 1.4 mg/dL Final      Calcium   Date Value Ref Range Status   02/08/2020 9.0 8.7 - 10.5 mg/dL Final     Total Protein   Date Value Ref Range Status   02/07/2020 6.8 6.0 - 8.4 g/dL Final     Albumin   Date Value Ref Range Status   02/07/2020 3.7 3.5 - 5.2 g/dL Final     Total Bilirubin   Date Value Ref Range Status   02/07/2020 0.3 0.1 - 1.0 mg/dL Final     Comment:     For infants and newborns, interpretation of results should be based  on gestational age, weight and in agreement with clinical  observations.  Premature Infant recommended reference ranges:  Up to 24 hours.............<8.0 mg/dL  Up to 48 hours............<12.0 mg/dL  3-5 days..................<15.0 mg/dL  6-29 days.................<15.0 mg/dL       Alkaline Phosphatase   Date Value Ref Range Status   02/07/2020 71 55 - 135 U/L Final     AST   Date Value Ref Range Status   02/07/2020 17 10 - 40 U/L Final     ALT   Date Value Ref Range Status   02/07/2020 8 (L) 10 - 44 U/L Final     Anion Gap   Date Value Ref Range Status   02/08/2020 9 8 - 16 mmol/L Final     eGFR if    Date Value Ref Range Status   02/08/2020 22 (A) >60 mL/min/1.73 m^2 Final     eGFR if non    Date Value Ref Range Status   02/08/2020 19 (A) >60 mL/min/1.73 m^2 Final     Comment:     Calculation used to obtain the estimated glomerular filtration  rate (eGFR) is the CKD-EPI equation.        Lab Results   Component Value Date    [id number] 1251 (H) 11/26/2012     Lab Results   Component Value Date    TSH 3.461 02/07/2020     02-07/    CTH was unremarkable.    EKG NSR.      Brain MRI showed multifocal RT MCA infarcts.         MRA H/N was unremarkable.     TTE showed NL EF with CLVH and CRISTINA.      HA1C 5.4.    .       Reviewed the neuroimaging independently       Assessment:       1. Acute ischemic cerebrovascular accident (CVA) involving middle cerebral artery territory    2. Stroke    3. Facial droop    4. Aphasia    5. Facial numbness    6. Dysphagia         Plan:               ACUTE RT MCA STROKE     SECONDARY TO NON-OCCLUSIVE LARGE VESSEL DISEASE (AS) SECONDARY TO SMOKING AND UNCONTROLLED HTN.       Continue DAPT.    PT/OT/ST.    Vascular Risk Factors (VRFs) stratification (BP control and Smoking Cessation) is the mainstay of stroke prevention (>90%). The benefit of antiplatelets is < 20% stroke risk reduction.   The patient verbalized full understanding.     Healthy diet and exercise                 MEDICAL/SURGICAL COMORBIDITIES     All relevant medical comorbidities noted and managed by primary care physician and medical care team.                I spent 75 minutes on the hospital unit and at the bedside rendering services for the patient including reviewing the chart, interviewing the patient, examining the patient, writing my notes and communicating my findings/recommendations with the patient/patient's family and primary team.              Best Baron MD, FAAN    Attending Neurologist/Epileptologist         Diplomate, American Board of Psychiatry and Neurology    Diplomate, American Board of Clinical Neurophysiology     Fellow, American Academy of Neurology

## 2020-02-09 NOTE — PT/OT/SLP PROGRESS
Physical Therapy  Treatment    Sheryl Falcon   MRN: [id number]   Admitting Diagnosis: Acute ischemic cerebrovascular accident (CVA) involving middle cerebral artery territory    PT Received On: 02/09/20  PT Start Time: 1012     PT Stop Time: 1035    PT Total Time (min): 23 min       Billable Minutes:  Gait Training 14 minutes and Therapeutic Exercise 9 minutes    Treatment Type: Treatment  PT/PTA: PTA     PTA Visit Number: 1       General Precautions: Standard, fall  Orthopedic Precautions: N/A   Braces:      Spiritual, Cultural Beliefs, Orthodox Practices, Values that Affect Care: no    Subjective:  Communicated with heather and nurse Felder prior to session.    Pain/Comfort  Pain Rating 1: 0/10    Objective:   Patient found with: peripheral IV, telemetry    Functional Mobility:  Bed Mobility: SBA       Transfers:CGA       Gait: min Assist       Stairs:n/a          Balance:   Static Sit: GOOD: Takes MODERATE challenges from all directions  Dynamic Sit: GOOD-: Incosistently Maintains balance through MODERATE excursions of active trunk movement,     Static Stand: FAIR+: Takes MINIMAL challenges from all directions  Dynamic stand: POOR+: Needs MIN (minimal ) assist during gait     Therapeutic Activities and Exercises:  Gait training with RW, min assist ambulated ~ 100 feet with decreased step length and narrow TRAVIS. Consistent verbal cues needed. At EOB completed B LE therex: LAQ, MIP, hip abduction/adduction and heel/toe taps.     AM-PAC 6 CLICK MOBILITY  How much help from another person does this patient currently need?   1 = Unable, Total/Dependent Assistance  2 = A lot, Maximum/Moderate Assistance  3 = A little, Minimum/Contact Guard/Supervision  4 = None, Modified Crucible/Independent    Turning over in bed (including adjusting bedclothes, sheets and blankets)?: 4  Sitting down on and standing up from a chair with arms (e.g., wheelchair, bedside commode, etc.): 3  Moving from lying on back to sitting on the side  of the bed?: 3  Moving to and from a bed to a chair (including a wheelchair)?: 3  Need to walk in hospital room?: 3  Climbing 3-5 steps with a railing?: 1  Basic Mobility Total Score: 17    AM-PAC Raw Score CMS G-Code Modifier Level of Impairment Assistance   6 % Total / Unable   7 - 9 CM 80 - 100% Maximal Assist   10 - 14 CL 60 - 80% Moderate Assist   15 - 19 CK 40 - 60% Moderate Assist   20 - 22 CJ 20 - 40% Minimal Assist   23 CI 1-20% SBA / CGA   24 CH 0% Independent/ Mod I     Patient left sitting EOB with all lines intact, call button in reach and Nurse Practicioner present.    Assessment:  Sheryl Falcon is a 64 y.o. female with a medical diagnosis of Acute ischemic cerebrovascular accident (CVA) involving middle cerebral artery territory.    Rehab identified problem list/impairments: Rehab identified problem list/impairments: weakness, impaired endurance    Rehab potential is good.    Activity tolerance: Good    Discharge recommendations: Discharge Facility/Level of Care Needs: home health PT     Barriers to discharge:      Equipment recommendations:       GOALS:   Multidisciplinary Problems     Physical Therapy Goals     Not on file          Multidisciplinary Problems (Resolved)        Problem: Physical Therapy Goal    Goal Priority Disciplines Outcome Goal Variances Interventions   Physical Therapy Goal   (Resolved)     PT, PT/OT Met     Description:  LTGs to be met within 7 days (2/15/20):  1.  Patient will perform bed mobility with SBA  2.  Patient will perform functional transfers with RW with SBA  3.  Patient will ambulate 150 feet with RW with SBA and no gross LOB  4. Patient will perform BLE therapeutic exercise x 10 reps with emphasis on coordination and motor control                      PLAN:    Patient to be seen 5 x/week  to address the above listed problems via gait training, therapeutic activities, therapeutic exercises  Plan of Care expires: 02/15/20  Plan of Care reviewed with:  patient         King Chan, PTA  02/09/2020

## 2020-02-09 NOTE — NURSING
Spoke with Nova Whitney NP via phone who stated this patient will be d/c once speech and occupational therapy sees her and home health is set up. Pt and daughter aware; both verbalized understanding. Will continue to monitor.

## 2020-02-09 NOTE — PLAN OF CARE
Problem: Fall Injury Risk  Goal: Absence of Fall and Fall-Related Injury  Outcome: Ongoing, Progressing       Family assist patient to BRP. Chronic mHx of back pain-patient will seek pain medication. Neuro checks normal.

## 2020-02-09 NOTE — DISCHARGE SUMMARY
[facility] Hospital Medicine  Discharge Summary      Patient Name: Sheryl Falcon  MRN: [id number]  Admission Date: 2/7/2020  Hospital Length of Stay: 1 days  Discharge Date and Time:  02/09/2020 1:04 PM  Attending Physician: Hay Viera MD   Discharging Provider: Nova Whitney NP  Primary Care Provider: Delmar Roy MD      HPI:   Sheryl Falcon is a 63 y/o female with PMH of CKD, COPD, CHF, HTN who presents to the emergency department with complaints of speech difficulty which started around 5:30 p.m. Associated symptoms include left side face droop, left arm and leg numbness, and trouble expressing herself. Symptoms have since improved. Vascular Neurology consulted, pt out of the window for TPA, recommended admission, MRI/MRA brain,TTE, lipid profile, hemoglobin A1c. Add Plavix to ASA x 21 days if MRI confirms stroke. Neurology consult in am.  In the ED, blood pressure elevated at 190 systolic, BUN 42, creatinine 2.6, glucose 164, CT head negative for acute findings. Patient admitted to inpatient for CVA r/o under the care of hospital medicine.     * No surgery found *      Hospital Course:   During admission, she was bradycardic 40-50's. Needs outpatient Cardiology consult. Difficulty swallowing and speaking improved. Failed bedside swallow eval by Speech. Modified barium swallow ordered and was negative for aspiration: regular foods and thin liquids OK. MRI brain showed Several small acute cortical infarcts. MRA brain and neck negative. Neurology consult stated stable for discharge. Plan for better B/P control, statin, and smoking cessation counseling.  ECHO showed EF 60% with diastolic dysfunction, Pul HTN. Patient was counseled on smoking cessation for 10 minutes, RX for nicotine patch offered, patient's daughter states unable to quit. Free Smoking cessation classes offered and referral sent. Home Health arranged for SN/OT/PT/SN. Patient seen and examined and deemed stable for d/c.              Consults:   Consults (From admission, onward)        Status Ordering Provider     Inpatient consult to Neurology  Once     Provider:  Best Baron MD    Completed SANJAY SMITH     Inpatient consult to Registered Dietitian/Nutritionist  Once     Provider:  (Not yet assigned)    Completed MIKAYLA ENRIQUEZ     Inpatient consult to Social Work  Once     Provider:  (Not yet assigned)    Acknowledged SANJAY SMITH     Inpatient consult to Telemedicine-Stroke  Once     Provider:  Zackery Ontiveros MD    Acknowledged KERVIN BELL     IP consult to case management/social work  Once     Provider:  (Not yet assigned)    Completed MIKAYLA ENRIQUEZ          No new Assessment & Plan notes have been filed under this hospital service since the last note was generated.  Service: Hospital Medicine    Final Active Diagnoses:    Diagnosis Date Noted POA    PRINCIPAL PROBLEM:  Acute ischemic cerebrovascular accident (CVA) involving middle cerebral artery territory [I63.519] 02/08/2020 Yes    Facial droop [R29.810] 02/07/2020 Yes    Bradycardia [R00.1] 02/09/2020 Yes    Hypertension [I10] 02/08/2020 Yes    Stage 4 chronic kidney disease [N18.4] 02/08/2020 Yes    Lumbar herniated disc [M51.26] 02/08/2020 Yes    Tobacco abuse [Z72.0] 02/08/2020 Yes    Stroke [I63.9] 02/08/2020 Yes      Problems Resolved During this Admission:       Discharged Condition: stable    Disposition: Home or Self Care    Follow Up:  Follow-up Information     Duncan Eden MD. Schedule an appointment as soon as possible for a visit in 1 week.    Specialties:  Cardiology, Cardiovascular Disease  Why:  Needs Cardiology followup for Bradycardia  Contact information:  [address] [facility] [place] [address] [phone]                 Patient Instructions:      Referral to Home health   Referral Priority: Routine Referral Type: Home Health   Referral Reason: Specialty Services Required   Requested Specialty: Home Health  Services   Number of Visits Requested: 1     Ambulatory referral/consult to Gene Smoking Cessation   Standing Status: Future   Referral Priority: Routine Referral Type: Consultation   Referral Reason: Specialty Services Required   Requested Specialty: Addiction Medicine   Number of Visits Requested: 1     Diet Cardiac   Order Comments: See Stroke Patient Education Guide Booklet for details.   Scheduling Instructions: Low salt: 2000 mg Sodium, No fried foods     Call 911 for any of the following:   Order Comments: Call 911  right away if any of the following warning signs come on suddenly, even if the symptoms only last for a few minutes. With stroke, timing is very important.   - Warning Signs of Stroke:  - Weakness: You may feel a sudden weakness, tingling or loss of feeling on one side of your face or body.  - Vision Problems: You may have sudden double vision or trouble seeing in one or both eyes.  - Speech Problems: You may have sudden trouble talking, slured speech, or problems understanding others.  - Headache: You may have sudden, severe headache.  - Movement Problems: You may experience dizziness, a feeling of spinning, a loss of balance, a feeling of falling or blackouts.       Significant Diagnostic Studies: Labs: All labs within the past 24 hours have been reviewed  Results for orders placed or performed during the hospital encounter of 02/07/20   Hepatitis C antibody   Result Value Ref Range    Hepatitis C Ab Negative Negative   CBC W/ AUTO DIFFERENTIAL   Result Value Ref Range    WBC 9.35 3.90 - 12.70 K/uL    RBC 4.12 4.00 - 5.40 M/uL    Hemoglobin 12.0 12.0 - 16.0 g/dL    Hematocrit 36.7 (L) 37.0 - 48.5 %    Mean Corpuscular Volume 89 82 - 98 fL    Mean Corpuscular Hemoglobin 29.1 27.0 - 31.0 pg    Mean Corpuscular Hemoglobin Conc 32.7 32.0 - 36.0 g/dL    RDW 13.4 11.5 - 14.5 %    Platelets 210 150 - 350 K/uL    MPV 10.8 9.2 - 12.9 fL    Immature Granulocytes 0.2 0.0 - 0.5 %    Gran # (ANC) 5.0 1.8  - 7.7 K/uL    Immature Grans (Abs) 0.02 0.00 - 0.04 K/uL    Lymph # 2.9 1.0 - 4.8 K/uL    Mono # 0.8 0.3 - 1.0 K/uL    Eos # 0.6 (H) 0.0 - 0.5 K/uL    Baso # 0.14 0.00 - 0.20 K/uL    nRBC 0 0 /100 WBC    Gran% 53.2 38.0 - 73.0 %    Lymph% 31.2 18.0 - 48.0 %    Mono% 8.0 4.0 - 15.0 %    Eosinophil% 5.9 0.0 - 8.0 %    Basophil% 1.5 0.0 - 1.9 %    Differential Method Automated    Comprehensive metabolic panel   Result Value Ref Range    Sodium 137 136 - 145 mmol/L    Potassium 4.1 3.5 - 5.1 mmol/L    Chloride 100 95 - 110 mmol/L    CO2 26 23 - 29 mmol/L    Glucose 164 (H) 70 - 110 mg/dL    BUN, Bld 42 (H) 8 - 23 mg/dL    Creatinine 2.6 (H) 0.5 - 1.4 mg/dL    Calcium 9.0 8.7 - 10.5 mg/dL    Total Protein 6.8 6.0 - 8.4 g/dL    Albumin 3.7 3.5 - 5.2 g/dL    Total Bilirubin 0.3 0.1 - 1.0 mg/dL    Alkaline Phosphatase 71 55 - 135 U/L    AST 17 10 - 40 U/L    ALT 8 (L) 10 - 44 U/L    Anion Gap 11 8 - 16 mmol/L    eGFR if African American 21.4 (A) >60 mL/min/1.73 m^2    eGFR if non African American 18.5 (A) >60 mL/min/1.73 m^2   Protime-INR   Result Value Ref Range    Prothrombin Time 11.1 9.0 - 12.5 sec    INR 1.1 0.8 - 1.2   TSH   Result Value Ref Range    TSH 3.461 0.400 - 4.000 uIU/mL   LDL - Lipid Panel   Result Value Ref Range    Cholesterol 170 120 - 199 mg/dL    Triglycerides 353 (H) 30 - 150 mg/dL    HDL 30 (L) 40 - 75 mg/dL    LDL Cholesterol 69.4 63.0 - 159.0 mg/dL    Hdl/Cholesterol Ratio 17.6 (L) 20.0 - 50.0 %    Total Cholesterol/HDL Ratio 5.7 (H) 2.0 - 5.0    Non-HDL Cholesterol 140 mg/dL   CBC auto differential   Result Value Ref Range    WBC 7.45 3.90 - 12.70 K/uL    RBC 3.83 (L) 4.00 - 5.40 M/uL    Hemoglobin 11.1 (L) 12.0 - 16.0 g/dL    Hematocrit 34.5 (L) 37.0 - 48.5 %    Mean Corpuscular Volume 90 82 - 98 fL    Mean Corpuscular Hemoglobin 29.0 27.0 - 31.0 pg    Mean Corpuscular Hemoglobin Conc 32.2 32.0 - 36.0 g/dL    RDW 13.5 11.5 - 14.5 %    Platelets 191 150 - 350 K/uL    MPV 10.7 9.2 - 12.9 fL     Immature Granulocytes 0.3 0.0 - 0.5 %    Gran # (ANC) 3.9 1.8 - 7.7 K/uL    Immature Grans (Abs) 0.02 0.00 - 0.04 K/uL    Lymph # 2.4 1.0 - 4.8 K/uL    Mono # 0.6 0.3 - 1.0 K/uL    Eos # 0.4 0.0 - 0.5 K/uL    Baso # 0.10 0.00 - 0.20 K/uL    nRBC 0 0 /100 WBC    Gran% 52.3 38.0 - 73.0 %    Lymph% 32.5 18.0 - 48.0 %    Mono% 8.2 4.0 - 15.0 %    Eosinophil% 5.4 0.0 - 8.0 %    Basophil% 1.3 0.0 - 1.9 %    Differential Method Automated    Basic metabolic panel   Result Value Ref Range    Sodium 140 136 - 145 mmol/L    Potassium 3.6 3.5 - 5.1 mmol/L    Chloride 102 95 - 110 mmol/L    CO2 29 23 - 29 mmol/L    Glucose 97 70 - 110 mg/dL    BUN, Bld 41 (H) 8 - 23 mg/dL    Creatinine 2.5 (H) 0.5 - 1.4 mg/dL    Calcium 9.0 8.7 - 10.5 mg/dL    Anion Gap 9 8 - 16 mmol/L    eGFR if African American 22 (A) >60 mL/min/1.73 m^2    eGFR if non African American 19 (A) >60 mL/min/1.73 m^2   CK-MB   Result Value Ref Range    CPK 53 20 - 180 U/L    CPK MB 1.2 0.1 - 6.5 ng/mL    MB% 2.3 0.0 - 5.0 %   Troponin I   Result Value Ref Range    Troponin I 0.016 0.000 - 0.026 ng/mL   Troponin I   Result Value Ref Range    Troponin I 0.015 0.000 - 0.026 ng/mL   Hemoglobin A1c   Result Value Ref Range    Hemoglobin A1C 5.4 4.0 - 5.6 %    Estimated Avg Glucose 108 68 - 131 mg/dL   Troponin I   Result Value Ref Range    Troponin I 0.009 0.000 - 0.026 ng/mL   Magnesium   Result Value Ref Range    Magnesium 2.4 1.6 - 2.6 mg/dL   Phosphorus   Result Value Ref Range    Phosphorus 4.2 2.7 - 4.5 mg/dL   CBC auto differential   Result Value Ref Range    WBC 6.88 3.90 - 12.70 K/uL    RBC 3.93 (L) 4.00 - 5.40 M/uL    Hemoglobin 11.2 (L) 12.0 - 16.0 g/dL    Hematocrit 35.8 (L) 37.0 - 48.5 %    Mean Corpuscular Volume 91 82 - 98 fL    Mean Corpuscular Hemoglobin 28.5 27.0 - 31.0 pg    Mean Corpuscular Hemoglobin Conc 31.3 (L) 32.0 - 36.0 g/dL    RDW 13.3 11.5 - 14.5 %    Platelets 174 150 - 350 K/uL    MPV 10.5 9.2 - 12.9 fL    Immature Granulocytes 0.3 0.0 -  0.5 %    Gran # (ANC) 3.3 1.8 - 7.7 K/uL    Immature Grans (Abs) 0.02 0.00 - 0.04 K/uL    Lymph # 2.5 1.0 - 4.8 K/uL    Mono # 0.6 0.3 - 1.0 K/uL    Eos # 0.3 0.0 - 0.5 K/uL    Baso # 0.10 0.00 - 0.20 K/uL    nRBC 0 0 /100 WBC    Gran% 48.1 38.0 - 73.0 %    Lymph% 36.5 18.0 - 48.0 %    Mono% 8.9 4.0 - 15.0 %    Eosinophil% 4.7 0.0 - 8.0 %    Basophil% 1.5 0.0 - 1.9 %    Differential Method Automated    Basic metabolic panel   Result Value Ref Range    Sodium 139 136 - 145 mmol/L    Potassium 4.0 3.5 - 5.1 mmol/L    Chloride 105 95 - 110 mmol/L    CO2 26 23 - 29 mmol/L    Glucose 91 70 - 110 mg/dL    BUN, Bld 42 (H) 8 - 23 mg/dL    Creatinine 2.8 (H) 0.5 - 1.4 mg/dL    Calcium 8.8 8.7 - 10.5 mg/dL    Anion Gap 8 8 - 16 mmol/L    eGFR if African American 20 (A) >60 mL/min/1.73 m^2    eGFR if non African American 17 (A) >60 mL/min/1.73 m^2   2D echo with color flow doppler   Result Value Ref Range    QEF 60 55 - 65    Mitral Valve Regurgitation MILD     Diastolic Dysfunction Yes (A)     Est. PA Systolic Pressure 31.52    POCT glucose   Result Value Ref Range    POCT Glucose 134 (H) 70 - 110 mg/dL     Pending Diagnostic Studies:     None         Medications:  Reconciled Home Medications:      Medication List      START taking these medications    clopidogreL 75 mg tablet  Commonly known as:  PLAVIX  Take 1 tablet (75 mg total) by mouth once daily. for 21 days  Start taking on:  February 10, 2020        CHANGE how you take these medications    atorvastatin 40 MG tablet  Commonly known as:  LIPITOR  Take 1 tablet (40 mg total) by mouth once daily.  Start taking on:  February 10, 2020  What changed:    · medication strength  · how much to take  · when to take this     hydrALAZINE 50 MG tablet  Commonly known as:  APRESOLINE  Take 1 tablet (50 mg total) by mouth every 8 (eight) hours.  What changed:  when to take this     oxyCODONE-acetaminophen  mg per tablet  Commonly known as:  PERCOCET  Take 1 tablet by mouth 3  (three) times daily as needed for Pain.  What changed:  Another medication with the same name was removed. Continue taking this medication, and follow the directions you see here.        CONTINUE taking these medications    albuterol sulfate 90 mcg/actuation Aepb  Commonly known as:  PROAIR RESPICLICK  Inhale 1 puff into the lungs every 4 (four) hours as needed.     amLODIPine 10 MG tablet  Commonly known as:  NORVASC  Take 1 tablet (10 mg total) by mouth once daily.     aspirin 81 MG EC tablet  Commonly known as:  ECOTRIN  Take 1 tablet (81 mg total) by mouth once daily.  Start taking on:  February 10, 2020     budesonide-formoterol 160-4.5 mcg 160-4.5 mcg/actuation Hfaa  Commonly known as:  SYMBICORT  Inhale 2 puffs into the lungs every 12 (twelve) hours.     pantoprazole 40 MG tablet  Commonly known as:  PROTONIX  Take 1 tablet (40 mg total) by mouth once daily.  Start taking on:  February 10, 2020        STOP taking these medications    ALPRAZolam 1 MG tablet  Commonly known as:  XANAX     cloNIDine 0.1 MG tablet  Commonly known as:  CATAPRES     ergocalciferol 50,000 unit Cap  Commonly known as:  ERGOCALCIFEROL     fentaNYL 25 mcg/hr  Commonly known as:  DURAGESIC     gabapentin 300 MG capsule  Commonly known as:  NEURONTIN     hydroCHLOROthiazide 12.5 MG Tab  Commonly known as:  HYDRODIURIL     losartan 100 MG tablet  Commonly known as:  COZAAR     metoprolol succinate 100 MG 24 hr tablet  Commonly known as:  TOPROL-XL     promethazine 25 MG tablet  Commonly known as:  PHENERGAN     VITAMIN D3 ORAL     zolpidem 10 mg Tab  Commonly known as:  AMBIEN            Indwelling Lines/Drains at time of discharge:   Lines/Drains/Airways     None             Time spent on the discharge of patient: 45 minutes  Patient was seen and examined on the date of discharge and determined to be suitable for discharge.    Nova Whitney NP  Department of Hospital Medicine  [facility]

## 2020-02-09 NOTE — PLAN OF CARE
Kyra met with patient and family at the bedside. Patient and family requested [facility] and [facility] . Sw sent referral to both agencies. Kyra spoke with rep at [facility] rep and she stated they will be unable to accept patient. Sw is awaiting on [facility] .      02/09/20 1663   Post-Acute Status   Post-Acute Authorization Home Health/Hospice   Home Health/Hospice Status Referrals Sent   Discharge Plan   Discharge Plan A Home with family

## 2020-02-09 NOTE — PLAN OF CARE
Kyra sent referral to [facility]. KYRA spoke with on-call nurse and she stated she will accept patient in [facility]. Kyra updated Nova, NP as well as bedside nurse.

## 2020-02-09 NOTE — DISCHARGE INSTRUCTIONS
Clinic was contacted by hospital to schedule appointments.  should reach out this week. If not, please contact [phone].     Thanks!      Understanding the Link Between High Blood Pressure and Stroke  Each day that your blood pressure is too high, your chances of having a stroke are increased. Normal blood pressure is considered to be less than 120 over less than 80 millimeters of mercury (mmHg) or 120/80 mmHg. A stroke is a loss of brain function caused by a lack of blood to the brain. Stroke can result from the damage that ongoing high blood pressure causes in your vessels. If the affected vessel stops supplying blood to the brain, a stroke results.  High blood pressure damages blood vessels    Vessels thicken  When blood presses against a vessel wall with too much force, muscles in the wall lose their ability to stretch. This causes the wall to thicken, which narrows the vessel passage and reduces blood flow.   Clots form  When blood pressure is too high, it can damage blood vessel walls which results in scar tissue. Fat and cholesterol (plaque) collect in the damaged spots. Blood cells stick to the plaque, forming a mass called a clot. A clot can block blood flow in the vessel.   Vessels break  Sometimes blood flows with enough force to weaken a vessel wall. If the vessel is small or damaged, the wall can break. When this happens, blood leaks into nearby tissue and kills cells. Other cells may die because blood cannot reach them.   Know the symptoms of stroke  During a stroke, blood supply to the brain is cut off. But with prompt medical help, a better recovery is more likely. Think of a stroke as a brain attack. Dont wait. Call 911 if you have any of the symptoms below:  · Sudden weakness or numbness on one side of the face or body, including a leg or an arm  · Sudden trouble seeing with one or both eyes  · Sudden double vision  · Sudden trouble talking, such as slurred speech  · Sudden severe  headache  · Sudden problems using or understanding words  · Sudden dizziness or loss of balance  · Seizures for the first time   · Any of these symptoms that occur and then resolve    Date Last Reviewed: 6/13/2015  © 5859-7025 [employer]. [address] [place] [address], [place] [address]. All rights reserved. This information is not intended as a substitute for professional medical care. Always follow your healthcare professional's instructions.          Stroke and Heart Disease  Every part of your body, including your heart and your brain, needs oxygen to work. Oxygen is carried in the blood. Blood vessels called arteries carry oxygen-rich blood throughout the body. Both heart attack and stroke are due to problems in the arteries. The same factors that cause heart disease can make you more likely to have a stroke.  · Heart attack. A heart attack is caused by blockage in an artery that carries blood to the heart muscle. If blood is blocked, that part of the heart muscle is damaged or dies.  · Stroke. If an artery supplying the brain is blocked, a stroke may result. This is called an ischemic stroke. It is caused by a piece of plaque breaking loose from an artery (such as a carotid artery in the neck) or from the heart and lodging in the brain. A stroke caused by the rupture of a weakened blood vessel is called a hemorrhagic stroke.  Both heart attack and stroke are medical emergencies that can lead to serious health problems. They can even be fatal.      Healthy artery  A healthy artery is a tube with flexible walls and a smooth inner lining. Blood flows freely through it.  Unhealthy artery  Artery problems start when the inner lining gets damaged. This is often due to risk factors such as smoking and high blood pressure. These can make the artery walls stiff. Plaque, a fatty mix of cholesterol and other material, forms in the lining. This narrows the channel. Plaque can break, restricting blood flow further.  It can also cause a blood clot to form. A blood clot may block the arterys channel completely.   Reducing your risk  Making changes that make your arteries healthier will help lower your risk for both heart attack and stroke. If you have heart disease, you may need to work on a few aspects of your lifestyle. But remember that the things that are good for your arteries, heart, and brain are also good for the rest of your body.  Your health care provider will work with you to modify lifestyle factors as needed to help prevent progression of atherosclerotic cardiovascular disease. This can lead to heart attack or stroke. Factors you may need to work on include:  · Diet. Your health care provider will give you information on dietary changes that you may need to make based on your situation. Your provider may recommend that you see a registered dietitian for help with diet changes. Changes may include:  ¨ Reducing fat and cholesterol intake  ¨ Reducing sodium (salt) intake, especially if you have high blood pressure  ¨ Increasing your intake of fresh vegetables and fruits  ¨ Eating lean proteins, such as fish, poultry, and legumes (beans and peas) and eating less red meat and processed meats  ¨ Using low- or no-fat diary products  ¨ Using vegetable and nut oils in limited amounts  ¨ Limiting sweets and processed foods such as chips, cookies, and baked goods  · Physical activity. Your health care provider may recommend that you increase your physical activity if you have not been as active as possible. Depending on your situation, your provider may advise you to include moderate to vigorous intensity activity for at least 40 minutes each day for at least 3 to 4 days per week. Examples of moderate to vigorous activity include:  ¨ Walking at a brisk pace, about 3 to 4 miles per hour  ¨ Jogging or running  ¨ Swimming or water aerobics  ¨ Hiking  ¨ Dancing  ¨ Martial arts  ¨ Tennis  ¨ Riding a bike or a stationary  bike  · Weight management. If you are overweight or obese, your health care provider will work with you to lose weight and lower your BMI (body mass image) to a normal or near-normal level. Making dietary changes and increasing physical activity can help.  · Smoking. If you smoke, break the smoking habit. Enroll in a stop-smoking program to improve your chances of success.  · Stress. Learn stress management techniques to help you deal with stress in your home and work life.  Date Last Reviewed: 6/8/2015  © 0630-7654 [employer]. [address] [place] [address]. All rights reserved. This information is not intended as a substitute for professional medical care. Always follow your healthcare professional's instructions.

## 2020-02-10 ENCOUNTER — TELEPHONE (OUTPATIENT)
Dept: CARDIOLOGY | Facility: CLINIC | Age: 65
End: 2020-02-10

## 2020-02-10 ENCOUNTER — TELEPHONE (OUTPATIENT)
Dept: NEPHROLOGY | Facility: CLINIC | Age: 65
End: 2020-02-10

## 2020-02-10 DIAGNOSIS — N18.4 STAGE 4 CHRONIC KIDNEY DISEASE: Primary | ICD-10-CM

## 2020-02-10 NOTE — TELEPHONE ENCOUNTER
Called to make follow up appt. She will have her daughter, Joy, at [phone] call us back to schedule a new patient appointment for bradycardia

## 2020-02-10 NOTE — TELEPHONE ENCOUNTER
----- Message from Alessandra Koch RN sent at 2/9/2020  1:47 PM CST -----  Discharging provider would like patient to be seen within the next week for bradycardia

## 2020-02-10 NOTE — TELEPHONE ENCOUNTER
Patient contacted and confirmed appointment on 02/14/2020 at [facility] location. Patient stated understanding with no questions or concerns.    ----- Message from Zahraa Linda LPN sent at 2/10/2020  8:17 AM CST -----  Noted in patient's discharge note--patient needs a one week follow up with   ----- Message -----  From: Alessandra Koch RN  Sent: 2/9/2020   1:47 PM CST  To: France Nicole RN, Amanda Russell LPN, #    Discharging provider would like patient to be seen within the next week for bradycardia

## 2020-02-10 NOTE — TELEPHONE ENCOUNTER
----- Message from Alessandra Koch RN sent at 2/9/2020  1:45 PM CST -----  Discharging provider would like this patient to be seen within the next week for worsening CKD 4.

## 2020-02-10 NOTE — TELEPHONE ENCOUNTER
Called to make follow up appt. She asked for me to call her daughter, Joy, at [phone].cm  I callled and left v/m to rtc to me in ref to making an  appt. Cm  ----- Message from Alessandra Koch RN sent at 2/9/2020  1:47 PM CST -----  Discharging provider would like patient to be seen within the next week for bradycardia

## 2020-02-11 ENCOUNTER — TELEPHONE (OUTPATIENT)
Dept: CARDIOLOGY | Facility: CLINIC | Age: 65
End: 2020-02-11

## 2020-02-14 ENCOUNTER — TELEPHONE (OUTPATIENT)
Dept: CARDIOLOGY | Facility: CLINIC | Age: 65
End: 2020-02-14

## 2020-02-14 ENCOUNTER — OFFICE VISIT (OUTPATIENT)
Dept: CARDIOLOGY | Facility: CLINIC | Age: 65
End: 2020-02-14
Payer: MEDICARE

## 2020-02-14 VITALS
SYSTOLIC BLOOD PRESSURE: 148 MMHG | BODY MASS INDEX: 22.2 KG/M2 | DIASTOLIC BLOOD PRESSURE: 63 MMHG | OXYGEN SATURATION: 91 % | HEART RATE: 50 BPM | WEIGHT: 133.38 LBS

## 2020-02-14 DIAGNOSIS — I10 ESSENTIAL HYPERTENSION: ICD-10-CM

## 2020-02-14 DIAGNOSIS — I63.9 CEREBROVASCULAR ACCIDENT (CVA), UNSPECIFIED MECHANISM: ICD-10-CM

## 2020-02-14 DIAGNOSIS — N18.4 STAGE 4 CHRONIC KIDNEY DISEASE: ICD-10-CM

## 2020-02-14 DIAGNOSIS — R07.89 OTHER CHEST PAIN: ICD-10-CM

## 2020-02-14 DIAGNOSIS — R00.2 PALPITATION: ICD-10-CM

## 2020-02-14 DIAGNOSIS — I25.118 CORONARY ARTERY DISEASE OF NATIVE ARTERY OF NATIVE HEART WITH STABLE ANGINA PECTORIS: Primary | ICD-10-CM

## 2020-02-14 DIAGNOSIS — Z72.0 TOBACCO ABUSE: ICD-10-CM

## 2020-02-14 DIAGNOSIS — I51.7 LAE (LEFT ATRIAL ENLARGEMENT): ICD-10-CM

## 2020-02-14 PROCEDURE — 3078F DIAST BP <80 MM HG: CPT | Mod: CPTII,ICN,S$GLB, | Performed by: INTERNAL MEDICINE

## 2020-02-14 PROCEDURE — 99214 PR OFFICE/OUTPT VISIT, EST, LEVL IV, 30-39 MIN: ICD-10-PCS | Mod: ICN,S$GLB,, | Performed by: INTERNAL MEDICINE

## 2020-02-14 PROCEDURE — 3077F PR MOST RECENT SYSTOLIC BLOOD PRESSURE >= 140 MM HG: ICD-10-PCS | Mod: CPTII,ICN,S$GLB, | Performed by: INTERNAL MEDICINE

## 2020-02-14 PROCEDURE — 1159F PR MEDICATION LIST DOCUMENTED IN MEDICAL RECORD: ICD-10-PCS | Mod: ICN,S$GLB,, | Performed by: INTERNAL MEDICINE

## 2020-02-14 PROCEDURE — 99999 PR PBB SHADOW E&M-EST. PATIENT-LVL III: ICD-10-PCS | Mod: PBBFAC,,, | Performed by: INTERNAL MEDICINE

## 2020-02-14 PROCEDURE — 99214 OFFICE O/P EST MOD 30 MIN: CPT | Mod: ICN,S$GLB,, | Performed by: INTERNAL MEDICINE

## 2020-02-14 PROCEDURE — 1159F MED LIST DOCD IN RCRD: CPT | Mod: ICN,S$GLB,, | Performed by: INTERNAL MEDICINE

## 2020-02-14 PROCEDURE — 99999 PR PBB SHADOW E&M-EST. PATIENT-LVL III: CPT | Mod: PBBFAC,,, | Performed by: INTERNAL MEDICINE

## 2020-02-14 PROCEDURE — 3078F PR MOST RECENT DIASTOLIC BLOOD PRESSURE < 80 MM HG: ICD-10-PCS | Mod: CPTII,ICN,S$GLB, | Performed by: INTERNAL MEDICINE

## 2020-02-14 PROCEDURE — 3077F SYST BP >= 140 MM HG: CPT | Mod: CPTII,ICN,S$GLB, | Performed by: INTERNAL MEDICINE

## 2020-02-14 RX ORDER — ALPRAZOLAM 1 MG/1
1 TABLET ORAL 2 TIMES DAILY
COMMUNITY
Start: 2020-02-12

## 2020-02-14 RX ORDER — FERROUS SULFATE, DRIED 160(50) MG
1 TABLET, EXTENDED RELEASE ORAL
COMMUNITY

## 2020-02-14 RX ORDER — ISOSORBIDE MONONITRATE 60 MG/1
60 TABLET, EXTENDED RELEASE ORAL DAILY
Qty: 30 TABLET | Refills: 5 | Status: SHIPPED | OUTPATIENT
Start: 2020-02-14

## 2020-02-14 NOTE — PROGRESS NOTES
Subjective:   Patient ID:  Sheryl Falcon is a 64 y.o. female who presents for evaluation of Shortness of Breath      63 yo female, hospital f/u  PMH CAD h/o NSTEMI s/p LHC in  at [facility], mod multivessel Dz, continue medical Rx, carotid Dz s/p CEA and PAD s/p aorta-fem at [facility] in 2018, left subclavian stenosis, CHFpEF, h/o CVA x4 , slow speech,. Smoker 1ppd 50 yrs,, HTN, COPD.    admitted for CVA tito MRI confirmed stroke. Neck MRA negative. Echo showed normal EF, severe LAE. Mild MR; EKG sinus bradycardia  C/o chest pain pressure at rest and exertion. Few times a week. For few minutes  C/o palpitation with dizziness. Lasted for minutes  Limited activity by back injury  Lives with 3 grandkids        Past Medical History:   Diagnosis Date    Cervical cancer     CHF (congestive heart failure)     Chronic kidney disease (CKD), stage II (mild)     COPD (chronic obstructive pulmonary disease)     Coronary artery disease of native artery of native heart with stable angina pectoris 2/14/2020    GERD (gastroesophageal reflux disease)     Hypertension     Liver disease     Lumbar herniated disc     PAD (peripheral artery disease)     Renal disorder     Stroke     Thyroid disease        Past Surgical History:   Procedure Laterality Date    CAROTID ENDARTERECTOMY      CHOLECYSTECTOMY      HYSTERECTOMY      PARATHYROIDECTOMY      peripheral surgery      TONSILLECTOMY         Social History     Tobacco Use    Smoking status: Current Every Day Smoker     Packs/day: 1.00     Years: 42.00     Pack years: 42.00     Types: Cigarettes    Smokeless tobacco: Never Used   Substance Use Topics    Alcohol use: No    Drug use: No       Family History   Problem Relation Age of Onset    Cancer Father     Cancer Daughter 37    COPD Neg Hx        Review of Systems   Constitution: Negative for decreased appetite, diaphoresis, fever, malaise/fatigue and night sweats.   HENT: Negative for nosebleeds.     Eyes: Negative for blurred vision and double vision.   Cardiovascular: Positive for chest pain and dyspnea on exertion. Negative for claudication, irregular heartbeat, leg swelling, near-syncope, orthopnea, palpitations, paroxysmal nocturnal dyspnea and syncope.   Respiratory: Negative for cough, shortness of breath, sleep disturbances due to breathing, snoring, sputum production and wheezing.    Endocrine: Negative for cold intolerance and polyuria.   Hematologic/Lymphatic: Does not bruise/bleed easily.   Skin: Negative for rash.   Musculoskeletal: Negative for back pain, falls, joint pain, joint swelling and neck pain.   Gastrointestinal: Negative for abdominal pain, heartburn, nausea and vomiting.   Genitourinary: Negative for dysuria, frequency and hematuria.   Neurological: Negative for difficulty with concentration, dizziness, focal weakness, headaches, light-headedness, numbness, seizures and weakness.        Slow speech   Psychiatric/Behavioral: Negative for depression, memory loss and substance abuse. The patient does not have insomnia.    Allergic/Immunologic: Negative for HIV exposure and hives.       Objective:   Physical Exam   Constitutional: She is oriented to person, place, and time. She appears well-nourished.   HENT:   Head: Normocephalic.   Eyes: Pupils are equal, round, and reactive to light.   Neck: Normal carotid pulses and no JVD present. Carotid bruit is not present. No thyromegaly present.   Cardiovascular: Regular rhythm, normal heart sounds and normal pulses.  No extrasystoles are present. Bradycardia present. PMI is not displaced. Exam reveals no gallop and no S3.   No murmur (ESM on bases) heard.  Pulmonary/Chest: Breath sounds normal. No stridor. No respiratory distress.   Abdominal: Soft. Bowel sounds are normal. There is no tenderness. There is no rebound.   Musculoskeletal: Normal range of motion.   Neurological: She is alert and oriented to person, place, and time.   Skin: Skin is  intact. No rash noted.   Psychiatric: Her behavior is normal.       Lab Results   Component Value Date    CHOL 170 02/07/2020     Lab Results   Component Value Date    HDL 30 (L) 02/07/2020     Lab Results   Component Value Date    LDLCALC 69.4 02/07/2020     Lab Results   Component Value Date    TRIG 353 (H) 02/07/2020     Lab Results   Component Value Date    CHOLHDL 17.6 (L) 02/07/2020       Chemistry        Component Value Date/Time     02/09/2020 0456    K 4.0 02/09/2020 0456     02/09/2020 0456    CO2 26 02/09/2020 0456    BUN 42 (H) 02/09/2020 0456    CREATININE 2.8 (H) 02/09/2020 0456    GLU 91 02/09/2020 0456        Component Value Date/Time    CALCIUM 8.8 02/09/2020 0456    ALKPHOS 71 02/07/2020 2241    AST 17 02/07/2020 2241    ALT 8 (L) 02/07/2020 2241    BILITOT 0.3 02/07/2020 2241    ESTGFRAFRICA 20 (A) 02/09/2020 0456    EGFRNONAA 17 (A) 02/09/2020 0456          Lab Results   Component Value Date    HGBA1C 5.4 02/08/2020     Lab Results   Component Value Date    TSH 3.461 02/07/2020     Lab Results   Component Value Date    INR 1.1 02/07/2020     Lab Results   Component Value Date    WBC 6.88 02/09/2020    HGB 11.2 (L) 02/09/2020    HCT 35.8 (L) 02/09/2020    MCV 91 02/09/2020     02/09/2020     BNP  @LABRCNTIP(BNP,BNPTRIAGEBLO)@  Estimated Creatinine Clearance: 17.8 mL/min (A) (based on SCr of 2.8 mg/dL (H)).  No results found in the last 24 hours.  No results found in the last 24 hours.  No results found in the last 24 hours.    Assessment:      1. Coronary artery disease of native artery of native heart with stable angina pectoris    2. Essential hypertension    3. Palpitation    4. LAE (left atrial enlargement)    5. Cerebrovascular accident (CVA), unspecified mechanism    6. Stage 4 chronic kidney disease    7. Tobacco abuse    8. Other chest pain        Plan:   ZIOPATCH r/o afib   Nuclear tsress test for chest pain  Continue ASA, plavix statin Amloidpine and hydrazine  Check  BP at home  Add Imdur 60mg daily  Counseled DASH  Recommend heart-healthy diet, weight control and regular exercise.  Trevon. Risk modification.   RTC in 6 months    I have reviewed all pertinent labs and cardiac studies. Plans and recommendations have been formulated under my direct supervision. All questions answered and patient voiced understanding. Patient to continue current medications.

## 2020-02-19 ENCOUNTER — TELEPHONE (OUTPATIENT)
Dept: CARDIOLOGY | Facility: HOSPITAL | Age: 65
End: 2020-02-19

## 2020-02-21 ENCOUNTER — TELEPHONE (OUTPATIENT)
Dept: INTERNAL MEDICINE | Facility: CLINIC | Age: 65
End: 2020-02-21

## 2020-02-21 NOTE — TELEPHONE ENCOUNTER
[facility] is [facility]. She needs to be r/s in [place] but I am not getting anything sooner than 2 weeks. Can you call pt to schedule please

## 2020-02-22 ENCOUNTER — EXTERNAL HOME HEALTH (OUTPATIENT)
Dept: HOME HEALTH SERVICES | Facility: HOSPITAL | Age: 65
End: 2020-02-22
Payer: MEDICARE

## 2020-02-24 ENCOUNTER — TELEPHONE (OUTPATIENT)
Dept: HOME HEALTH SERVICES | Facility: HOSPITAL | Age: 65
End: 2020-02-24

## 2020-02-25 ENCOUNTER — HOSPITAL ENCOUNTER (OUTPATIENT)
Dept: CARDIOLOGY | Facility: HOSPITAL | Age: 65
Discharge: HOME | End: 2020-02-25
Attending: INTERNAL MEDICINE
Payer: MEDICARE

## 2020-02-25 DIAGNOSIS — I25.118 CORONARY ARTERY DISEASE OF NATIVE ARTERY OF NATIVE HEART WITH STABLE ANGINA PECTORIS: ICD-10-CM

## 2020-02-25 DIAGNOSIS — R00.2 PALPITATION: ICD-10-CM

## 2020-02-25 DIAGNOSIS — I51.7 LAE (LEFT ATRIAL ENLARGEMENT): ICD-10-CM

## 2020-02-25 PROCEDURE — 0296T CV CARDIAC MONITOR - 3-14 DAY ADULT (CUPID ONLY): ICD-10-PCS | Mod: ,,, | Performed by: INTERNAL MEDICINE

## 2020-02-25 PROCEDURE — 0296T CV CARDIAC MONITOR - 3-14 DAY ADULT (CUPID ONLY): CPT | Mod: ,,, | Performed by: INTERNAL MEDICINE

## 2020-02-25 PROCEDURE — 0298T CV CARDIAC MONITOR - 3-14 DAY ADULT (CUPID ONLY): ICD-10-PCS | Mod: ,,, | Performed by: INTERNAL MEDICINE

## 2020-02-25 PROCEDURE — 0298T CV CARDIAC MONITOR - 3-14 DAY ADULT (CUPID ONLY): CPT | Mod: ,,, | Performed by: INTERNAL MEDICINE

## 2020-02-27 ENCOUNTER — TELEPHONE (OUTPATIENT)
Dept: NEPHROLOGY | Facility: CLINIC | Age: 65
End: 2020-02-27

## 2020-03-17 ENCOUNTER — TELEPHONE (OUTPATIENT)
Dept: CARDIOLOGY | Facility: CLINIC | Age: 65
End: 2020-03-17

## 2020-03-17 NOTE — TELEPHONE ENCOUNTER
Pt contacted about results, pt sister in law verbalized understanding.            ----- Message from Duncan Eden MD sent at 3/17/2020  4:09 PM CDT -----  holter showed rare premature beats

## 2020-03-26 ENCOUNTER — TELEPHONE (OUTPATIENT)
Dept: HOME HEALTH SERVICES | Facility: HOSPITAL | Age: 65
End: 2020-03-26

## 2020-04-01 ENCOUNTER — CLINICAL SUPPORT (OUTPATIENT)
Dept: SMOKING CESSATION | Facility: CLINIC | Age: 65
End: 2020-04-01
Payer: COMMERCIAL

## 2020-04-01 DIAGNOSIS — F17.200 NICOTINE DEPENDENCE: Primary | ICD-10-CM

## 2020-04-01 PROCEDURE — 99404 PR PREVENT COUNSEL,INDIV,60 MIN: ICD-10-PCS | Mod: S$GLB,,,

## 2020-04-01 PROCEDURE — 99404 PREV MED CNSL INDIV APPRX 60: CPT | Mod: S$GLB,,,

## 2020-04-01 RX ORDER — DM/P-EPHED/ACETAMINOPH/DOXYLAM 30-7.5/3
2 LIQUID (ML) ORAL
Qty: 144 LOZENGE | Refills: 0 | Status: SHIPPED | OUTPATIENT
Start: 2020-04-01

## 2020-04-01 RX ORDER — VARENICLINE TARTRATE 0.5 (11)-1
KIT ORAL
Qty: 53 TABLET | Refills: 0 | Status: SHIPPED | OUTPATIENT
Start: 2020-04-01

## 2020-04-01 NOTE — Clinical Note
Due to COVID-19 intake was done by phone. Patient is smoking 3-4 cigarettes per day. Patient will be participating in weekly tobacco cessation meetings and will begin the prescribed tobacco cessation medication regime of the Chantix 1 starter pack and 2 mg nicotine lozenges as needed. FTND Score of 5 which indicates a high level of nicotine dependence.  ERLIN-D score of 22 is perceived as some mental distress or depression at this time patient stated due to pandemic of COVID 19. Will continue to monitor.

## 2020-07-30 ENCOUNTER — CLINICAL SUPPORT (OUTPATIENT)
Dept: SMOKING CESSATION | Facility: CLINIC | Age: 65
End: 2020-07-30
Payer: COMMERCIAL

## 2020-07-30 DIAGNOSIS — F17.200 NICOTINE DEPENDENCE: Primary | ICD-10-CM

## 2020-07-30 PROCEDURE — 99407 PR TOBACCO USE CESSATION INTENSIVE >10 MINUTES: ICD-10-PCS | Mod: S$GLB,,,

## 2020-07-30 PROCEDURE — 99407 BEHAV CHNG SMOKING > 10 MIN: CPT | Mod: S$GLB,,,

## 2020-07-30 NOTE — PROGRESS NOTES
Spoke with patients sister  today in regard to smoking cessation progress for 3-month telephone follow up. Sister stated that she is  tobacco free. Congratulated patient on her accomplishment. Informed patient of benefit period, future calls and contact information if any further help or support is needed. Will complete smart form for  month follow up on Quit attempt #1.

## 2020-07-31 ENCOUNTER — TELEPHONE (OUTPATIENT)
Dept: CARDIOLOGY | Facility: CLINIC | Age: 65
End: 2020-07-31

## 2020-07-31 NOTE — TELEPHONE ENCOUNTER
Returned call to NP. She was asking some questions about the patients past medical history with [facility].   ----- Message from Gracia Garcias sent at 7/31/2020  8:57 AM CDT -----  Regarding: JUANITA (NP- JERMAINE Salgado)  Jermaine is requesting call back in regards to questions about pt's recent tests and labs.                  Pls call back at [phone]

## 2021-04-07 ENCOUNTER — CLINICAL SUPPORT (OUTPATIENT)
Dept: SMOKING CESSATION | Facility: CLINIC | Age: 66
End: 2021-04-07
Payer: COMMERCIAL

## 2021-04-07 DIAGNOSIS — F17.200 NICOTINE DEPENDENCE: Primary | ICD-10-CM

## 2021-04-07 PROCEDURE — 99406 PR TOBACCO USE CESSATION INTERMEDIATE 3-10 MINUTES: ICD-10-PCS | Mod: S$GLB,,,

## 2021-04-07 PROCEDURE — 99406 BEHAV CHNG SMOKING 3-10 MIN: CPT | Mod: S$GLB,,,

## 2021-08-10 NOTE — PLAN OF CARE
Pt had no acute changes this shift.   POC reviewed with patient and daughter at bedside; both verbalized understanding.   AAOX4, VSS, NSR/SB (low 40s) on tele monitor.   Pt admitted for stroke workup. Evaluated by PT/OT/ST; barium swallow study (passed), MRI/MRA brain/neck and ECHO this shift.   Pt free from falls this shift.   Slight facial droop to left side of face noted.   Frequent weight shift encouraged while in bed; up to chair for meals.   Pt has c/o chronic back pain controlled by PRN medication.   PIV intact.   Pt on room air.   Hourly rounding complete.   Neuro checks q4 hours.   Pt currently resting comfortably in bed.   Will continue to monitor.    What Type Of Note Output Would You Prefer (Optional)?: Standard Output Hpi Title: Evaluation of Skin Lesions How Severe Are Your Spot(S)?: mild Have Your Spot(S) Been Treated In The Past?: has not been treated